# Patient Record
Sex: MALE | Race: BLACK OR AFRICAN AMERICAN | NOT HISPANIC OR LATINO | Employment: OTHER | URBAN - METROPOLITAN AREA
[De-identification: names, ages, dates, MRNs, and addresses within clinical notes are randomized per-mention and may not be internally consistent; named-entity substitution may affect disease eponyms.]

---

## 2025-06-26 ENCOUNTER — APPOINTMENT (OUTPATIENT)
Dept: RADIOLOGY | Facility: HOSPITAL | Age: 75
DRG: 149 | End: 2025-06-26
Payer: MEDICARE

## 2025-06-26 ENCOUNTER — HOSPITAL ENCOUNTER (INPATIENT)
Facility: HOSPITAL | Age: 75
LOS: 1 days | Discharge: ANOTHER HOSPITAL | End: 2025-06-28
Attending: EMERGENCY MEDICINE | Admitting: INTERNAL MEDICINE
Payer: MEDICARE

## 2025-06-26 ENCOUNTER — APPOINTMENT (OUTPATIENT)
Dept: CARDIOLOGY | Facility: HOSPITAL | Age: 75
DRG: 149 | End: 2025-06-26
Payer: MEDICARE

## 2025-06-26 DIAGNOSIS — R42 VERTIGO: ICD-10-CM

## 2025-06-26 DIAGNOSIS — R42 DIZZINESS: Primary | ICD-10-CM

## 2025-06-26 DIAGNOSIS — K21.00 GASTROESOPHAGEAL REFLUX DISEASE WITH ESOPHAGITIS WITHOUT HEMORRHAGE: ICD-10-CM

## 2025-06-26 DIAGNOSIS — I63.422 STROKE DUE TO EMBOLISM OF LEFT ANTERIOR CEREBRAL ARTERY (MULTI): ICD-10-CM

## 2025-06-26 LAB
ALBUMIN SERPL BCP-MCNC: 4.4 G/DL (ref 3.4–5)
ALP SERPL-CCNC: 77 U/L (ref 33–136)
ALT SERPL W P-5'-P-CCNC: 10 U/L (ref 10–52)
ANION GAP SERPL CALC-SCNC: 13 MMOL/L (ref 10–20)
APPEARANCE UR: CLEAR
APTT PPP: 22 SECONDS (ref 26–36)
AST SERPL W P-5'-P-CCNC: 19 U/L (ref 9–39)
ATRIAL RATE: 91 BPM
BASOPHILS # BLD AUTO: 0.01 X10*3/UL (ref 0–0.1)
BASOPHILS NFR BLD AUTO: 0.1 %
BILIRUB SERPL-MCNC: 0.8 MG/DL (ref 0–1.2)
BILIRUB UR STRIP.AUTO-MCNC: NEGATIVE MG/DL
BUN SERPL-MCNC: 15 MG/DL (ref 6–23)
CALCIUM SERPL-MCNC: 9.2 MG/DL (ref 8.6–10.3)
CARDIAC TROPONIN I PNL SERPL HS: 10 NG/L (ref 0–20)
CHLORIDE SERPL-SCNC: 102 MMOL/L (ref 98–107)
CO2 SERPL-SCNC: 28 MMOL/L (ref 21–32)
COLOR UR: ABNORMAL
CREAT SERPL-MCNC: 1.3 MG/DL (ref 0.5–1.3)
EGFRCR SERPLBLD CKD-EPI 2021: 57 ML/MIN/1.73M*2
EOSINOPHIL # BLD AUTO: 0 X10*3/UL (ref 0–0.4)
EOSINOPHIL NFR BLD AUTO: 0 %
ERYTHROCYTE [DISTWIDTH] IN BLOOD BY AUTOMATED COUNT: 12.8 % (ref 11.5–14.5)
GLUCOSE BLD MANUAL STRIP-MCNC: 160 MG/DL (ref 74–99)
GLUCOSE SERPL-MCNC: 169 MG/DL (ref 74–99)
GLUCOSE UR STRIP.AUTO-MCNC: ABNORMAL MG/DL
HCT VFR BLD AUTO: 40.1 % (ref 41–52)
HGB BLD-MCNC: 13.3 G/DL (ref 13.5–17.5)
IMM GRANULOCYTES # BLD AUTO: 0.04 X10*3/UL (ref 0–0.5)
IMM GRANULOCYTES NFR BLD AUTO: 0.6 % (ref 0–0.9)
INR PPP: 1.1 (ref 0.9–1.1)
KETONES UR STRIP.AUTO-MCNC: ABNORMAL MG/DL
LEUKOCYTE ESTERASE UR QL STRIP.AUTO: NEGATIVE
LYMPHOCYTES # BLD AUTO: 0.67 X10*3/UL (ref 0.8–3)
LYMPHOCYTES NFR BLD AUTO: 9.8 %
MCH RBC QN AUTO: 30.1 PG (ref 26–34)
MCHC RBC AUTO-ENTMCNC: 33.2 G/DL (ref 32–36)
MCV RBC AUTO: 91 FL (ref 80–100)
MONOCYTES # BLD AUTO: 0.31 X10*3/UL (ref 0.05–0.8)
MONOCYTES NFR BLD AUTO: 4.5 %
MUCOUS THREADS #/AREA URNS AUTO: NORMAL /LPF
NEUTROPHILS # BLD AUTO: 5.81 X10*3/UL (ref 1.6–5.5)
NEUTROPHILS NFR BLD AUTO: 85 %
NITRITE UR QL STRIP.AUTO: NEGATIVE
NRBC BLD-RTO: 0 /100 WBCS (ref 0–0)
P AXIS: 78 DEGREES
P OFFSET: 210 MS
P ONSET: 160 MS
PH UR STRIP.AUTO: 6.5 [PH]
PLATELET # BLD AUTO: 239 X10*3/UL (ref 150–450)
POTASSIUM SERPL-SCNC: 4.2 MMOL/L (ref 3.5–5.3)
PR INTERVAL: 136 MS
PROT SERPL-MCNC: 7.5 G/DL (ref 6.4–8.2)
PROT UR STRIP.AUTO-MCNC: ABNORMAL MG/DL
PROTHROMBIN TIME: 11.6 SECONDS (ref 9.8–12.4)
Q ONSET: 228 MS
QRS COUNT: 15 BEATS
QRS DURATION: 138 MS
QT INTERVAL: 390 MS
QTC CALCULATION(BAZETT): 479 MS
QTC FREDERICIA: 448 MS
R AXIS: -83 DEGREES
RBC # BLD AUTO: 4.42 X10*6/UL (ref 4.5–5.9)
RBC # UR STRIP.AUTO: ABNORMAL MG/DL
RBC #/AREA URNS AUTO: NORMAL /HPF
SODIUM SERPL-SCNC: 139 MMOL/L (ref 136–145)
SP GR UR STRIP.AUTO: 1.03
T AXIS: 53 DEGREES
T OFFSET: 423 MS
UROBILINOGEN UR STRIP.AUTO-MCNC: NORMAL MG/DL
VENTRICULAR RATE: 91 BPM
WBC # BLD AUTO: 6.8 X10*3/UL (ref 4.4–11.3)
WBC #/AREA URNS AUTO: NORMAL /HPF

## 2025-06-26 PROCEDURE — 2550000001 HC RX 255 CONTRASTS: Performed by: EMERGENCY MEDICINE

## 2025-06-26 PROCEDURE — 85025 COMPLETE CBC W/AUTO DIFF WBC: CPT

## 2025-06-26 PROCEDURE — 2500000004 HC RX 250 GENERAL PHARMACY W/ HCPCS (ALT 636 FOR OP/ED)

## 2025-06-26 PROCEDURE — 96375 TX/PRO/DX INJ NEW DRUG ADDON: CPT

## 2025-06-26 PROCEDURE — 99285 EMERGENCY DEPT VISIT HI MDM: CPT | Performed by: EMERGENCY MEDICINE

## 2025-06-26 PROCEDURE — 93005 ELECTROCARDIOGRAM TRACING: CPT

## 2025-06-26 PROCEDURE — 70551 MRI BRAIN STEM W/O DYE: CPT

## 2025-06-26 PROCEDURE — 96374 THER/PROPH/DIAG INJ IV PUSH: CPT

## 2025-06-26 PROCEDURE — 82947 ASSAY GLUCOSE BLOOD QUANT: CPT

## 2025-06-26 PROCEDURE — 36415 COLL VENOUS BLD VENIPUNCTURE: CPT

## 2025-06-26 PROCEDURE — 80053 COMPREHEN METABOLIC PANEL: CPT

## 2025-06-26 PROCEDURE — 70498 CT ANGIOGRAPHY NECK: CPT

## 2025-06-26 PROCEDURE — 85730 THROMBOPLASTIN TIME PARTIAL: CPT

## 2025-06-26 PROCEDURE — 2500000002 HC RX 250 W HCPCS SELF ADMINISTERED DRUGS (ALT 637 FOR MEDICARE OP, ALT 636 FOR OP/ED)

## 2025-06-26 PROCEDURE — 85610 PROTHROMBIN TIME: CPT

## 2025-06-26 PROCEDURE — 84484 ASSAY OF TROPONIN QUANT: CPT

## 2025-06-26 PROCEDURE — 70498 CT ANGIOGRAPHY NECK: CPT | Performed by: STUDENT IN AN ORGANIZED HEALTH CARE EDUCATION/TRAINING PROGRAM

## 2025-06-26 PROCEDURE — 70496 CT ANGIOGRAPHY HEAD: CPT | Performed by: STUDENT IN AN ORGANIZED HEALTH CARE EDUCATION/TRAINING PROGRAM

## 2025-06-26 PROCEDURE — 70551 MRI BRAIN STEM W/O DYE: CPT | Performed by: STUDENT IN AN ORGANIZED HEALTH CARE EDUCATION/TRAINING PROGRAM

## 2025-06-26 PROCEDURE — 81001 URINALYSIS AUTO W/SCOPE: CPT

## 2025-06-26 RX ORDER — MECLIZINE HYDROCHLORIDE 25 MG/1
25 TABLET ORAL ONCE
Status: COMPLETED | OUTPATIENT
Start: 2025-06-26 | End: 2025-06-26

## 2025-06-26 RX ORDER — FAMOTIDINE 10 MG/ML
20 INJECTION, SOLUTION INTRAVENOUS ONCE
Status: COMPLETED | OUTPATIENT
Start: 2025-06-26 | End: 2025-06-26

## 2025-06-26 RX ORDER — ONDANSETRON HYDROCHLORIDE 2 MG/ML
4 INJECTION, SOLUTION INTRAVENOUS ONCE
Status: COMPLETED | OUTPATIENT
Start: 2025-06-26 | End: 2025-06-26

## 2025-06-26 RX ADMIN — FAMOTIDINE 20 MG: 10 INJECTION, SOLUTION INTRAVENOUS at 15:00

## 2025-06-26 RX ADMIN — MECLIZINE HYDROCHLORIDE 25 MG: 25 TABLET ORAL at 17:37

## 2025-06-26 RX ADMIN — ONDANSETRON 4 MG: 2 INJECTION INTRAMUSCULAR; INTRAVENOUS at 15:00

## 2025-06-26 RX ADMIN — IOHEXOL 75 ML: 350 INJECTION, SOLUTION INTRAVENOUS at 15:39

## 2025-06-26 ASSESSMENT — PAIN SCALES - GENERAL
PAINLEVEL_OUTOF10: 0 - NO PAIN

## 2025-06-26 ASSESSMENT — PAIN - FUNCTIONAL ASSESSMENT: PAIN_FUNCTIONAL_ASSESSMENT: 0-10

## 2025-06-26 NOTE — ED PROVIDER NOTES
History of Present Illness     History provided by: Patient and his son at bedside.  Limitations to History: None   External Records Reviewed with Brief Summary: No old records available for review.    HPI:  Raymundo Herndon is a 74 y.o. male with a past medical history of hypertension not on any medications, and GERD presenting to the emergency department today with concern for dizziness started around 2 AM.  States that he has been having difficulty with ambulating, feels that he is going to fall over when he walks.  Has some lightheadedness but denies any vision changes.  Does have a left eye cataract.  Has been having nausea and vomiting associated with this as well.  Not nonbilious nonbloody.  Denies any headache chest pain shortness of breath.  States that he otherwise feels well.  Was at 1 point told that he may have glaucoma of the left eye however has not followed up with optometry since.  Has not followed with a primary care doctor has not had any medication refills and has not been on any medications long-term.  No other associate signs or symptoms report this time.    Physical Exam   Triage vitals:  T 36.6 °C (97.9 °F)  HR 87  /85  RR 18  O2 98 % None (Room air)    General: Awake, alert, in no acute distress  Eyes: Gaze conjugate.  No scleral icterus or injection cataract formation in the left eye.  Horizontal nystagmus.  HENT: Normo-cephalic, atraumatic. No stridor  CV: Regular rate, regular rhythm. Radial pulses 2+ bilaterally  Resp: Breathing non-labored, speaking in full sentences.  Clear to auscultation bilaterally  GI: Soft, non-distended, non-tender. No rebound or guarding.  MSK/Extremities: No gross bony deformities. Moving all extremities  Skin: Warm. Appropriate color  Neuro: Alert. Oriented. Face symmetric. Speech is fluent.  Gross strength and sensation intact in b/l UE and LEs.  Has a horizontal nystagmus that does not fatigue, is present at rest.  Finger-nose without any dysmetria.   Heel-to-shin without any difficulty, no dysarthria.  NIH of 0.  Sensation intact.  Cranial nerves II through XII intact 5 out of 5 shoulder shrug.  Facial nerve in all 3 distributions bilaterally symmetric. Gait is abnormal with drift when ambulating.  Psych: Appropriate mood and affect    Medical Decision Making & ED Course   Medical Decision Makin y.o. male with the above past medical history not currently treated not on any medications with the exception of gas medication presenting to the emergency department today with concern for dizziness and unstable gait and nausea vomiting secondary to the dizziness discharge started around 2 AM.  Patient is out of any window for TNK, his NIH is 0.  Concerned that this may be a posterior circulation stroke versus vertigo, initially had a nystagmus that seem to be rotary then stable out to be a horizontal nystagmus that does not fatigue.  The rest of his neuroexam is otherwise reassuring.  Kidney function LFTs are otherwise reassuring.  No significant leukocytosis or anemia.  Troponin was 10.  CT a head and neck was obtained which was otherwise reassuring the results are in the ED course tab.  Will obtain an MRI to rule out stroke.  Was given meclizine and Pepcid for his nausea vomiting in addition to Zofran.  No significant hypertension, vitally remained stable.  At the time of signout still pending MRI and final disposition attending physician assumed care for the patient at signout.  ----    Differential diagnoses considered include but are not limited to: Posterior circulation stroke, vertigo, nausea/vomiting     Social Determinants of Health which Significantly Impact Care: None identified     EKG Independent Interpretation: See ED Course    Independent Result Review and Interpretation: See ED course    Chronic conditions affecting the patient's care: See HPI    The patient was discussed with the following consultants/services: None    Care Considerations: See  [facility]    ED Course:  ED Course as of 06/27/25 1114   Thu Jun 26, 2025   1628 CT angio head and neck w and wo IV contrast  IMPRESSION:  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      2. Mild diffuse narrowing of the P1 and P2 segment of the right PCA.  Otherwise no hemodynamically significant stenosis of the intracranial  vasculature.      3. Approximately 30% right and 40% left narrowing of the internal  carotid arteries in the neck.      Recommendation: If clinically concerned for small acute ischemic  infarct, further evaluation with MR of the brain without contrast is  recommended. [KD]   2131 MRI report read as reviewed.  Does not have any acute stroke.  No bleed reported.  Patient is ambulatory.  Tolerating p.o.  He can be safely discharged home plan for outpatient follow-up.  Return precautions are discussed. [PV]      ED Course User Index  [KD] Celeste Perez DO  [PV] Maico Chacon MD         Diagnoses as of 06/27/25 1114   Dizziness   Vertigo     Disposition   Patient was signed out to Dr. Harley at 1900 pending completion of their work-up.  Please see the next provider's transition of care note for the remainder of the patient's care.     Seen and discussed with ED Attending  Celeste Perez DO, PGY-2  Emergency Medicine     Celeste Perez DO  Resident  06/26/25 1909       Celeste Perez DO  Resident  06/27/25 1114

## 2025-06-27 PROBLEM — R42 DIZZINESS: Status: ACTIVE | Noted: 2025-06-27

## 2025-06-27 LAB
EST. AVERAGE GLUCOSE BLD GHB EST-MCNC: 105 MG/DL
HBA1C MFR BLD: 5.3 % (ref ?–5.7)
HOLD SPECIMEN: NORMAL
HOLD SPECIMEN: NORMAL

## 2025-06-27 PROCEDURE — 83036 HEMOGLOBIN GLYCOSYLATED A1C: CPT | Mod: AHULAB | Performed by: NURSE PRACTITIONER

## 2025-06-27 PROCEDURE — 97161 PT EVAL LOW COMPLEX 20 MIN: CPT | Mod: GP

## 2025-06-27 PROCEDURE — 2500000004 HC RX 250 GENERAL PHARMACY W/ HCPCS (ALT 636 FOR OP/ED): Performed by: NURSE PRACTITIONER

## 2025-06-27 PROCEDURE — 97165 OT EVAL LOW COMPLEX 30 MIN: CPT | Mod: GO

## 2025-06-27 PROCEDURE — 1200000002 HC GENERAL ROOM WITH TELEMETRY DAILY

## 2025-06-27 PROCEDURE — 97530 THERAPEUTIC ACTIVITIES: CPT | Mod: GO

## 2025-06-27 PROCEDURE — 36415 COLL VENOUS BLD VENIPUNCTURE: CPT | Performed by: NURSE PRACTITIONER

## 2025-06-27 PROCEDURE — 97116 GAIT TRAINING THERAPY: CPT | Mod: GP

## 2025-06-27 PROCEDURE — 99223 1ST HOSP IP/OBS HIGH 75: CPT | Performed by: INTERNAL MEDICINE

## 2025-06-27 PROCEDURE — 2500000001 HC RX 250 WO HCPCS SELF ADMINISTERED DRUGS (ALT 637 FOR MEDICARE OP): Performed by: NURSE PRACTITIONER

## 2025-06-27 PROCEDURE — 99222 1ST HOSP IP/OBS MODERATE 55: CPT | Performed by: STUDENT IN AN ORGANIZED HEALTH CARE EDUCATION/TRAINING PROGRAM

## 2025-06-27 RX ORDER — ACETAMINOPHEN 325 MG/1
650 TABLET ORAL EVERY 4 HOURS PRN
Status: DISCONTINUED | OUTPATIENT
Start: 2025-06-27 | End: 2025-06-28 | Stop reason: HOSPADM

## 2025-06-27 RX ORDER — ATORVASTATIN CALCIUM 40 MG/1
40 TABLET, FILM COATED ORAL NIGHTLY
Status: DISCONTINUED | OUTPATIENT
Start: 2025-06-28 | End: 2025-06-28

## 2025-06-27 RX ORDER — CLOPIDOGREL BISULFATE 75 MG/1
75 TABLET ORAL DAILY
Status: DISCONTINUED | OUTPATIENT
Start: 2025-06-28 | End: 2025-06-28 | Stop reason: HOSPADM

## 2025-06-27 RX ORDER — ONDANSETRON HYDROCHLORIDE 2 MG/ML
4 INJECTION, SOLUTION INTRAVENOUS EVERY 8 HOURS PRN
Status: DISCONTINUED | OUTPATIENT
Start: 2025-06-27 | End: 2025-06-28 | Stop reason: HOSPADM

## 2025-06-27 RX ORDER — ACETAMINOPHEN 650 MG/1
650 SUPPOSITORY RECTAL EVERY 4 HOURS PRN
Status: DISCONTINUED | OUTPATIENT
Start: 2025-06-27 | End: 2025-06-28 | Stop reason: HOSPADM

## 2025-06-27 RX ORDER — ENOXAPARIN SODIUM 100 MG/ML
40 INJECTION SUBCUTANEOUS EVERY 24 HOURS
Status: DISCONTINUED | OUTPATIENT
Start: 2025-06-27 | End: 2025-06-28 | Stop reason: HOSPADM

## 2025-06-27 RX ORDER — NAPROXEN SODIUM 220 MG/1
81 TABLET, FILM COATED ORAL DAILY
Status: DISCONTINUED | OUTPATIENT
Start: 2025-06-28 | End: 2025-06-28 | Stop reason: HOSPADM

## 2025-06-27 RX ORDER — ACETAMINOPHEN 160 MG/5ML
650 SOLUTION ORAL EVERY 4 HOURS PRN
Status: DISCONTINUED | OUTPATIENT
Start: 2025-06-27 | End: 2025-06-28 | Stop reason: HOSPADM

## 2025-06-27 RX ORDER — ONDANSETRON 4 MG/1
4 TABLET, FILM COATED ORAL EVERY 8 HOURS PRN
Status: DISCONTINUED | OUTPATIENT
Start: 2025-06-27 | End: 2025-06-28 | Stop reason: HOSPADM

## 2025-06-27 RX ORDER — SODIUM CHLORIDE 9 MG/ML
75 INJECTION, SOLUTION INTRAVENOUS CONTINUOUS
Status: ACTIVE | OUTPATIENT
Start: 2025-06-27 | End: 2025-06-27

## 2025-06-27 RX ORDER — PANTOPRAZOLE SODIUM 40 MG/10ML
40 INJECTION, POWDER, LYOPHILIZED, FOR SOLUTION INTRAVENOUS
Status: DISCONTINUED | OUTPATIENT
Start: 2025-06-27 | End: 2025-06-28 | Stop reason: HOSPADM

## 2025-06-27 RX ORDER — POLYETHYLENE GLYCOL 3350 17 G/17G
17 POWDER, FOR SOLUTION ORAL DAILY PRN
Status: DISCONTINUED | OUTPATIENT
Start: 2025-06-27 | End: 2025-06-28 | Stop reason: HOSPADM

## 2025-06-27 RX ORDER — MECLIZINE HYDROCHLORIDE 25 MG/1
25 TABLET ORAL 3 TIMES DAILY PRN
Status: DISCONTINUED | OUTPATIENT
Start: 2025-06-27 | End: 2025-06-28 | Stop reason: HOSPADM

## 2025-06-27 RX ORDER — PANTOPRAZOLE SODIUM 40 MG/1
40 TABLET, DELAYED RELEASE ORAL
Status: DISCONTINUED | OUTPATIENT
Start: 2025-06-27 | End: 2025-06-28 | Stop reason: HOSPADM

## 2025-06-27 RX ADMIN — SODIUM CHLORIDE 75 ML/HR: 0.9 INJECTION, SOLUTION INTRAVENOUS at 06:00

## 2025-06-27 RX ADMIN — ENOXAPARIN SODIUM 40 MG: 40 INJECTION SUBCUTANEOUS at 06:00

## 2025-06-27 RX ADMIN — ATORVASTATIN CALCIUM 40 MG: 40 TABLET, FILM COATED ORAL at 23:54

## 2025-06-27 RX ADMIN — PANTOPRAZOLE SODIUM 40 MG: 40 TABLET, DELAYED RELEASE ORAL at 06:00

## 2025-06-27 SDOH — ECONOMIC STABILITY: FOOD INSECURITY: WITHIN THE PAST 12 MONTHS, YOU WORRIED THAT YOUR FOOD WOULD RUN OUT BEFORE YOU GOT THE MONEY TO BUY MORE.: NEVER TRUE

## 2025-06-27 SDOH — SOCIAL STABILITY: SOCIAL INSECURITY
WITHIN THE LAST YEAR, HAVE YOU BEEN RAPED OR FORCED TO HAVE ANY KIND OF SEXUAL ACTIVITY BY YOUR PARTNER OR EX-PARTNER?: NO

## 2025-06-27 SDOH — SOCIAL STABILITY: SOCIAL INSECURITY
WITHIN THE LAST YEAR, HAVE YOU BEEN KICKED, HIT, SLAPPED, OR OTHERWISE PHYSICALLY HURT BY YOUR PARTNER OR EX-PARTNER?: NO

## 2025-06-27 SDOH — ECONOMIC STABILITY: INCOME INSECURITY: IN THE PAST 12 MONTHS HAS THE ELECTRIC, GAS, OIL, OR WATER COMPANY THREATENED TO SHUT OFF SERVICES IN YOUR HOME?: NO

## 2025-06-27 SDOH — SOCIAL STABILITY: SOCIAL INSECURITY: WITHIN THE LAST YEAR, HAVE YOU BEEN HUMILIATED OR EMOTIONALLY ABUSED IN OTHER WAYS BY YOUR PARTNER OR EX-PARTNER?: NO

## 2025-06-27 SDOH — SOCIAL STABILITY: SOCIAL INSECURITY: WITHIN THE LAST YEAR, HAVE YOU BEEN AFRAID OF YOUR PARTNER OR EX-PARTNER?: NO

## 2025-06-27 SDOH — SOCIAL STABILITY: SOCIAL INSECURITY: WERE YOU ABLE TO COMPLETE ALL THE BEHAVIORAL HEALTH SCREENINGS?: YES

## 2025-06-27 SDOH — SOCIAL STABILITY: SOCIAL INSECURITY: DO YOU FEEL UNSAFE GOING BACK TO THE PLACE WHERE YOU ARE LIVING?: NO

## 2025-06-27 SDOH — SOCIAL STABILITY: SOCIAL INSECURITY: HAS ANYONE EVER THREATENED TO HURT YOUR FAMILY OR YOUR PETS?: NO

## 2025-06-27 SDOH — ECONOMIC STABILITY: FOOD INSECURITY: WITHIN THE PAST 12 MONTHS, THE FOOD YOU BOUGHT JUST DIDN'T LAST AND YOU DIDN'T HAVE MONEY TO GET MORE.: NEVER TRUE

## 2025-06-27 SDOH — SOCIAL STABILITY: SOCIAL INSECURITY: DO YOU FEEL ANYONE HAS EXPLOITED OR TAKEN ADVANTAGE OF YOU FINANCIALLY OR OF YOUR PERSONAL PROPERTY?: NO

## 2025-06-27 SDOH — SOCIAL STABILITY: SOCIAL INSECURITY: DOES ANYONE TRY TO KEEP YOU FROM HAVING/CONTACTING OTHER FRIENDS OR DOING THINGS OUTSIDE YOUR HOME?: NO

## 2025-06-27 SDOH — SOCIAL STABILITY: SOCIAL INSECURITY: ABUSE: ADULT

## 2025-06-27 SDOH — SOCIAL STABILITY: SOCIAL INSECURITY: ARE YOU OR HAVE YOU BEEN THREATENED OR ABUSED PHYSICALLY, EMOTIONALLY, OR SEXUALLY BY ANYONE?: NO

## 2025-06-27 SDOH — SOCIAL STABILITY: SOCIAL INSECURITY: HAVE YOU HAD THOUGHTS OF HARMING ANYONE ELSE?: NO

## 2025-06-27 SDOH — SOCIAL STABILITY: SOCIAL INSECURITY: ARE THERE ANY APPARENT SIGNS OF INJURIES/BEHAVIORS THAT COULD BE RELATED TO ABUSE/NEGLECT?: NO

## 2025-06-27 ASSESSMENT — COGNITIVE AND FUNCTIONAL STATUS - GENERAL
MOVING TO AND FROM BED TO CHAIR: A LOT
PERSONAL GROOMING: A LITTLE
TOILETING: A LITTLE
WALKING IN HOSPITAL ROOM: A LITTLE
STANDING UP FROM CHAIR USING ARMS: A LITTLE
DAILY ACTIVITIY SCORE: 24
DRESSING REGULAR UPPER BODY CLOTHING: A LITTLE
TURNING FROM BACK TO SIDE WHILE IN FLAT BAD: A LITTLE
HELP NEEDED FOR BATHING: A LITTLE
WALKING IN HOSPITAL ROOM: A LOT
CLIMB 3 TO 5 STEPS WITH RAILING: TOTAL
MOBILITY SCORE: 14
PATIENT BASELINE BEDBOUND: NO
MOVING TO AND FROM BED TO CHAIR: A LITTLE
MOBILITY SCORE: 20
STANDING UP FROM CHAIR USING ARMS: A LOT
TOILETING: A LITTLE
DRESSING REGULAR LOWER BODY CLOTHING: A LITTLE
CLIMB 3 TO 5 STEPS WITH RAILING: TOTAL
WALKING IN HOSPITAL ROOM: A LITTLE
MOBILITY SCORE: 16
PERSONAL GROOMING: A LITTLE
DRESSING REGULAR LOWER BODY CLOTHING: A LITTLE
MOVING FROM LYING ON BACK TO SITTING ON SIDE OF FLAT BED WITH BEDRAILS: A LITTLE
CLIMB 3 TO 5 STEPS WITH RAILING: A LITTLE
TURNING FROM BACK TO SIDE WHILE IN FLAT BAD: A LITTLE
HELP NEEDED FOR BATHING: A LITTLE
CLIMB 3 TO 5 STEPS WITH RAILING: A LITTLE
MOBILITY SCORE: 20
DAILY ACTIVITIY SCORE: 19
WALKING IN HOSPITAL ROOM: A LITTLE
STANDING UP FROM CHAIR USING ARMS: A LITTLE
DAILY ACTIVITIY SCORE: 24
STANDING UP FROM CHAIR USING ARMS: A LITTLE
DAILY ACTIVITIY SCORE: 20
MOVING TO AND FROM BED TO CHAIR: A LITTLE
MOVING TO AND FROM BED TO CHAIR: A LITTLE

## 2025-06-27 ASSESSMENT — LIFESTYLE VARIABLES
HOW MANY STANDARD DRINKS CONTAINING ALCOHOL DO YOU HAVE ON A TYPICAL DAY: PATIENT DOES NOT DRINK
HOW OFTEN DO YOU HAVE A DRINK CONTAINING ALCOHOL: NEVER
SKIP TO QUESTIONS 9-10: 1
AUDIT-C TOTAL SCORE: 0
AUDIT-C TOTAL SCORE: 0
HOW OFTEN DO YOU HAVE 6 OR MORE DRINKS ON ONE OCCASION: NEVER

## 2025-06-27 ASSESSMENT — ACTIVITIES OF DAILY LIVING (ADL)
ADL_ASSISTANCE: INDEPENDENT
DRESSING YOURSELF: INDEPENDENT
JUDGMENT_ADEQUATE_SAFELY_COMPLETE_DAILY_ACTIVITIES: YES
BATHING: INDEPENDENT
HEARING - RIGHT EAR: FUNCTIONAL
LACK_OF_TRANSPORTATION: NO
TOILETING: NEEDS ASSISTANCE
HEARING - LEFT EAR: FUNCTIONAL
BATHING_ASSISTANCE: STAND BY
ADEQUATE_TO_COMPLETE_ADL: YES
PATIENT'S MEMORY ADEQUATE TO SAFELY COMPLETE DAILY ACTIVITIES?: YES
ASSISTIVE_DEVICE: EYEGLASSES
WALKS IN HOME: NEEDS ASSISTANCE
GROOMING: INDEPENDENT
LACK_OF_TRANSPORTATION: NO
ADL_ASSISTANCE: INDEPENDENT
FEEDING YOURSELF: INDEPENDENT

## 2025-06-27 ASSESSMENT — PAIN SCALES - GENERAL
PAINLEVEL_OUTOF10: 0 - NO PAIN

## 2025-06-27 ASSESSMENT — PAIN - FUNCTIONAL ASSESSMENT
PAIN_FUNCTIONAL_ASSESSMENT: 0-10

## 2025-06-27 ASSESSMENT — PATIENT HEALTH QUESTIONNAIRE - PHQ9
SUM OF ALL RESPONSES TO PHQ9 QUESTIONS 1 & 2: 0
1. LITTLE INTEREST OR PLEASURE IN DOING THINGS: NOT AT ALL
2. FEELING DOWN, DEPRESSED OR HOPELESS: NOT AT ALL

## 2025-06-27 NOTE — PROGRESS NOTES
"Occupational Therapy    Evaluation/Treatment    Patient Name: Raymundo Herndon  MRN: [id number]  Department: [place] [address]  Room: [address] [place] [address]  Today's Date: 06/27/25  Time Calculation  Start Time: 1522  Stop Time: 1554  Time Calculation (min): 32 min       Assessment:  OT Assessment: Patient demonstrates significant standing balance impairments that impact safety during ADL and functional mobility.  Patient would benefit from further skilled OT services to maximize functional performance.  Prognosis: Excellent  Barriers to Discharge Home: Caregiver assistance, Physical needs  Caregiver Assistance: Other (Comment) (Family is \"in & out\"; 24/7 assist/supervision is not available)  Physical Needs: Ambulating household distances limited by function/safety, High falls risk due to function or environment, In-home setup navigation limited by function/safety  Evaluation/Treatment Tolerance: Patient tolerated treatment well  Medical Staff Made Aware: Yes  End of Session Communication: Bedside nurse  End of Session Patient Position: Bed, 2 rail up, Alarm on (All needs within reach.)  OT Assessment Results: Decreased ADL status, Decreased functional mobility  Prognosis: Excellent  Evaluation/Treatment Tolerance: Patient tolerated treatment well  Medical Staff Made Aware: Yes  Strengths: Ability to acquire knowledge, Attitude of self, Premorbid level of function, Physical health  Barriers to Participation: Comorbidities, Housing layout    Plan:  Treatment Interventions: ADL retraining, Functional transfer training, Neuromuscular reeducation  OT Frequency: 3 times per week (this acute inpatient hospitalization)  OT Discharge Recommendations: High intensity level of continued care (Based on current functional status and rehab potential, patient is anticipated to tolerate and benefit from 5 or more days per week of skilled rehabilitative therapy after discharge from this acute inpatient hospitalization.)  Equipment Recommended upon Discharge: " Wheeled walker  OT Recommended Transfer Status: Minimal assist, Assist of 1  OT - OK to Discharge: Yes (OT POC established this date)  Treatment Interventions: ADL retraining, Functional transfer training, Neuromuscular reeducation    Subjective   OT Visit Info:  OT Received On: 06/27/25    General Visit Info:   General  Reason for Referral: Dizziness; Vertigo  Past Medical History Relevant to Rehab: Presented to ED due to c/o dizziness and vomiting; PMH: HTN, GERD, left cataract  Family/Caregiver Present: No  Prior to Session Communication: Bedside nurse  Patient Position Received: Bed, 2 rail up, Alarm on  General Comment: Agreeable to Eval; Pleasant and cooperative throughout; No c/o dizziness for duration of session     Precautions:  Hearing/Visual Limitations: WFL; not wearing glasses during session; no difficulty hearing  Medical Precautions: Fall precautions  Precautions Comment: tele, IV    Pain:  Pain Assessment  Pain Assessment: 0-10  0-10 (Numeric) Pain Score: 0 - No pain    Objective   Cognition:  Overall Cognitive Status: Within Functional Limits  Arousal/Alertness: Appropriate responses to stimuli  Orientation Level: Oriented X4  Following Commands: Follows all commands and directions without difficulty  Cognition Comments: Cues req'd for pacing  Safety/Judgement: Exceptions to WFL  Unable to Self-Monitor and Self-Correct Consistently: Minimal  Insight: Mild       Home Living:  Type of Home: House  Lives With: Other (Comment) (Cousin & cousin's children)  Home Adaptive Equipment: None  Home Layout: Two level, Full bath main level (Patient resides on basement level; no bathroom basement; laundry in basement,  but not working)  Alternate Level Stairs-Number of Steps: 13 stairs to basement  Home Access:  (3 UZIEL)  Bathroom Shower/Tub: Tub/shower unit  Bathroom Toilet: Standard  Bathroom Equipment: None    Prior Function:  Level of [place]: Independent with ADLs and functional transfers, Independent  with homemaking with ambulation  Receives Help From: Family ( assist/supervision not available)  ADL Assistance: Independent  Homemaking Assistance: Independent (Shares IADL as needed; able to make coffee daily and prepare snacks & light meals)  Laundry:  (Washer & dryer not working currently; family goes to laundromat - Patient has gone with them 1x)  Driving/Transportation: Family/Friend (Patient does not drive; DL ; does not leave home much)  Ambulatory Assistance: Independent  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; 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)  Leisure: Spends a number of hours in prayer on the floor  Hand Dominance: Right       ADL:  Eating Assistance: Independent  Grooming Assistance: Stand by  Grooming Deficit: Setup, Supervision/safety  Bathing Assistance: Stand by (CGA if standing)  Bathing Deficit: Setup, Steadying, Verbal cueing, Supervision/safety  UE Dressing Assistance: Minimal  UE Dressing Deficit: Setup, Supervision/safety  LE Dressing Assistance: Stand by (CGA while standing)  LE Dressing Deficit: Setup, Steadying, Verbal cueing, Supervision/safety  Toileting Deficit: Setup, Supervison/safety  ADL Comments: Able to complete ADL after setup, but very unsafe while standing due to balance deficits.    Activities of Daily Living:    UE Bathing  UE Bathing Level of Assistance: Close supervision, Setup  UE Bathing Where Assessed: Edge of bed  UE Bathing Comments: After setup and cuing to initiate task, patient able to retrieve bathing wipes to cleanse chest, underarms, and arms.    LE Bathing  LE Bathing Comments: Patient demo'd ability to reach all areas, but declined bathing LB during session.  Anticipate sup while seated or at bed level and CGA-Min assist for safety if standing.    UE Dressing  UE Dressing Comments: Min assist to manage tele and change gowns    LE Dressing  LE Dressing: Yes  Sock Level of Assistance: Distant supervision, Setup  LE Dressing Where  Assessed: Edge of bed  LE Dressing Comments: Instructed on safe technique.  Patient able to lift up each leg to don/doff socks without difficulty. Reiterated safety precautions and technique for bottoms to minimize risk of falls. Patient expressed understanding.       Activity Tolerance:  Endurance: Endurance does not limit participation in activity  Activity Tolerance Comments: WFL    Functional Standing Tolerance:  Time: 2-3 minutes continuously  Activity: Standing balance exercises and functional mobility to/from bathroom  Functional Standing Tolerance Comments: Standing tolerance WFL    Bed Mobility/Transfers: Bed Mobility  Bed Mobility: Yes  Bed Mobility 1  Bed Mobility 1: Supine to sitting, Sitting to supine, Scooting  Level of Assistance 1: Distant supervision, Minimal verbal cues  Bed Mobility Comments 1: HOB slightly elevated. No physical assist req'd.  Min verbal cues req'd to position self for comfort.    Transfers  Transfer: Yes  Transfer 1  Transfer From 1: Bed to  Transfer to 1: Stand  Technique 1: Sit to stand, Stand to sit  Transfer Device 1:  (No device for 1st stand; walker for 2nd stand)  Transfer Level of Assistance 1: Contact guard, Minimal verbal cues  Trials/Comments 1: Instructed on safe technique and body positioning  Transfers 2  Transfer From 2: Stand to  Transfer to 2: Toilet  Technique 2: Stand to sit, Sit to stand (ambulating)  Transfer Device 2: Walker (Grab bar)  Transfer Level of Assistance 2: Contact guard    Toilet Transfers  Toilet Transfer From: Walker  Toilet Transfer Type: To and from  Toilet Transfer to: Standard toilet  Toilet Transfer Technique: Ambulating  Toilet Transfers: Contact guard, Verbal cues  Toilet Transfers Comments: Cuing req'd for safe body position in front of toilet prior to attempting to sit, and use of grab bar in lieu of holding onto walker.  Able to complete transfer 2x without difficulty, but CGA for safety.    Functional Mobility:  Functional  Mobility  Functional Mobility Performed: Yes  Functional Mobility 1  Surface 1: Level tile  Device 1: Rolling walker  Assistance 1: Minimum assistance, Moderate assistance  Comments 1: Unsteady with primarily CGA for safety; significant LOB when turning & changing direction with min assist to correct at times and mod assist to correct at times    Sitting Balance:  Static Sitting Balance  Static Sitting-Balance Support: No upper extremity supported, Feet supported  Static Sitting-Level of Assistance: Distant supervision  Dynamic Sitting Balance  Dynamic Sitting-Balance Support: No upper extremity supported, Feet supported  Dynamic Sitting-Level of Assistance: Close supervision (Verbal cues)  Dynamic Sitting-Balance: Trunk control activities, Reaching across midline, Reaching for objects  Dynamic Sitting-Comments: Progressive leaning toward left side with activity; able to correct with cuing    Standing Balance:  Static Standing Balance  Static Standing-Balance Support: No upper extremity supported  Static Standing-Level of Assistance: Close supervision  Static Standing-Comment/Number of Minutes: No LOB noted while standing eob  Dynamic Standing Balance  Dynamic Standing-Balance Support: No upper extremity supported  Dynamic Standing-Level of Assistance: Moderate assistance  Dynamic Standing-Balance: Reaching for objects, Turning (Marching)  Dynamic Standing-Comments: Significant LOB noted with mod assist to correct for safety; slightly improved with smaller movements and cuing for safety awareness and pacing; further improved with BUE support using walker       Therapy/Activity: Therapeutic Activity  Therapeutic Activity Performed: Yes  Therapeutic Activity 1: Encouraged oob activity and facilitated safe participation in ADL and functional mobility.    Balance/Neuromuscular Re-Education  Balance/Neuromuscular Re-Education Activity Performed: Yes  Balance/Neuromuscular Re-Education Activity 1: Dynamic standing balance  "at bedside without UB support and with use of walker.  Significant instability noted with mod assist req'd to correct at times.  Cuing req'd for pacing and safety awareness.     Vision:Vision - Basic Assessment  Visual History: Cataracts  Patient Visual Report:  (Reports vision is WFL despite left cataract and report of \"possible glaucoma\")    Sensation:  Light Touch: No apparent deficits    Strength:  Strength Comments: BUE WNL     Perception:  Inattention/Neglect: Cues to maintain midline in sitting, Cues to maintain midline in standing    Coordination:  Movements are Fluid and Coordinated: Yes  Finger to Nose: Intact (BUE)  Rapid Alternating Movements: Intact  Finger to Target: Intact (BUE)     Hand Function:  Hand Function  Gross Grasp: Functional  Coordination: Functional    Extremities: RUE   RUE : Within Functional Limits and LUE   LUE: Within Functional Limits    Outcome Measures: [facility] Daily Activity  Putting on and taking off regular lower body clothing: A little  Bathing (including washing, rinsing, drying): A little  Putting on and taking off regular upper body clothing: A little  Toileting, which includes using toilet, bedpan or urinal: A little  Taking care of personal grooming such as brushing teeth: A little  Eating Meals: None  Daily Activity - Total Score: 19    OT Adult Other Outcome Measures  Modified Barthel Index (Gracia version): 64    Patient scored 64 on MBI (Modified Barthel Index-Gracia Version) indicating moderate dependence. Per the scoring guidelines, this score indicates a prediction of the followin-80: If living alone will probably need a number of community services to cope.    Deficits noted in the following areas: standing balance affecting all areas of ADL and functional mobility.    Patient does not have adequate assistance or supervision at home to remain safe at this time and is at a high risk for falls.    Education Documentation  Precautions, taught by Nohemi Sharma, OT " at 6/27/2025  5:24 PM.  Learner: Patient  Readiness: Acceptance  Method: Explanation  Response: Verbalizes Understanding  Comment: Safety during ADL and functional mobility    ADL Training, taught by Nohemi Sharma OT at 6/27/2025  5:24 PM.  Learner: Patient  Readiness: Acceptance  Method: Explanation  Response: Verbalizes Understanding  Comment: Safety during ADL and functional mobility    Education Comments  No comments found.      Goals:  Encounter Problems       Encounter Problems (Active)       Bathing       STG - Patient will bathe body Mod I       Start:  06/27/25    Expected End:  07/11/25               Dressings Lower Extremities       STG - Patient will complete lower body dressing Mod I       Start:  06/27/25    Expected End:  07/11/25               Functional Balance       STG - Maintains dynamic standing balance with and without upper extremity support to safely complete ADL Mod I       Start:  06/27/25    Expected End:  07/11/25       INTERVENTIONS:1. Practice standing with minimal support.2. Educate patient about standing tolerance.3. Educate patient about independence with gait, transfers, and ADL's.4. Educate patient about use of assistive device.5. Educate patient about self-directed care.            Functional Mobility       STG - Patient will safely ambulate household distance to mobilize to/from bathroom Mod I without LOB       Start:  06/27/25    Expected End:  07/11/25               OT Transfers       STG - Patient will perform toilet transfer Mod I       Start:  06/27/25    Expected End:  07/11/25               Toileting       STG - Patient will complete toileting tasks with Mod I/I       Start:  06/27/25    Expected End:  07/11/25

## 2025-06-27 NOTE — PROGRESS NOTES
06/27/25 1630   Discharge Planning   Home or Post Acute Services Post acute facilities (Rehab/SNF/etc)   Type of Post Acute Facility Services Rehab   Expected Discharge Disposition Rehab   Does the patient need discharge transport arranged? Yes   RoundTrip coordination needed? Yes   Has discharge transport been arranged? No     Met with patient at bedside, he would like to go to [facility], no auth needed. Referral sent. Possible dc there tomorrow. SW will follow.

## 2025-06-27 NOTE — DISCHARGE INSTRUCTIONS
Please follow-up with the neurosurgeons to continue care for your aneurysm.  Please follow-up with your primary care physician in the next 2 days to ensure improvement in symptoms.  Please return to ED for worsening headache, difficulty moving arms or legs, change in behavior or any other concerning symptoms.

## 2025-06-27 NOTE — PROGRESS NOTES
Pharmacy Medication History     Source of Information: patient    Additional concerns with the patient's PTA list.   Per patient no OTC items on regular basis and no Rx.   Notified Provider via Haiku : no ( no changes )     The following updates were made to the Prior to Admission medication list:     Medications ADDED:   N/a  Medications CHANGED:  N/a  Medications REMOVED:   N/a  Medications NOT TAKING:   N/a    Allergy reviewed : Yes    Meds 2 Beds : No    Outpatient pharmacy confirmed and updated in chart : Yes    Pharmacy name: [facility]    The list below reflectives the updated PTA list. Please review each medication in order reconciliation for additional clarification and justification.    Prior to Admission Medications   Prescriptions Last Dose   aluminum hydrox-magnesium carb 160-105 mg tablet,chewable 6/26/2025 Morning   Sig: Chew and swallow 2 tablets every 12 hours if needed (for dyspepsia).      Facility-Administered Medications: None       The list below reflectives the updated allergy list. Please review each documented allergy for additional clarification and justification.    No Known Allergies       06/27/25 at 12:55 PM - Jodi Ritchie

## 2025-06-27 NOTE — PROGRESS NOTES
Physical Therapy    Physical Therapy Evaluation & Treatment    Patient Name: Raymundo Herndon  MRN: [id number]  Department: [facility] [address]  Room: [address] [place] [address]  Today's Date: 6/27/2025   Time Calculation  Start Time: 0935  Stop Time: 1002  Time Calculation (min): 27 min    Assessment/Plan   PT Assessment  PT Assessment Results: Impaired balance, Decreased mobility, Impaired vision, Decreased coordination, Decreased endurance, Decreased safety awareness  Rehab Prognosis: Good  Barriers to Discharge Home: Caregiver assistance, Cognition needs, Physical needs  Caregiver Assistance: Caregiver assistance needed per identified barriers - however, level of patient's required assistance exceeds assistance available at home  Cognition Needs: 24hr supervision for safety awareness needed, Insight of patient limited regarding functional ability/needs  Physical Needs: Stair navigation into home limited by function/safety, Stair navigation to access bed limited by function/safety, Ambulating household distances limited by function/safety, Intermittent mobility assistance needed, High falls risk due to function or environment  Evaluation/Treatment Tolerance: Patient tolerated treatment well  Medical Staff Made Aware: Yes  Strengths: Ability to acquire knowledge  Barriers to Participation: Comorbidities  End of Session Communication: Bedside nurse, Care Coordinator  Assessment Comment: Pt presents today with BLE strength WFL, impaired balance, and fair activity tolerance. Pt is currently below the reported PLOF requiring min assist for standing balance and to safely complete ambulation trial. Continued PT would benefit the pt to address the above deficits and attempt stair training as medically safe/appropriate to reduce pt's risk of falls. Pt does not have 24 hr assistance at home requiring assistance with ambulation and stairs to navigate at home. At this time, pt is anticipated to benefit from high intensity therapy at D/C.  End of Session Patient  Position: Up in chair, Alarm on   IP OR SWING BED PT PLAN  Inpatient or Swing Bed: Inpatient  PT Plan  Treatment/Interventions: Bed mobility, Transfer training, Gait training, Stair training, Balance training, Neuromuscular re-education, Endurance training, Therapeutic exercise, Therapeutic activity, Home exercise program, Postural re-education  PT Plan: Ongoing PT  PT Frequency: 4 times per week (During this acute inpatient hospitalization)  PT Discharge Recommendations: High intensity level of continued care (Based on current functional status and rehab potential, patient is anticipated to tolerate and benefit from 5 or more days per week of skilled rehabilitative therapy after discharge from this acute inpatient hospitalization.)  Equipment Recommended upon Discharge: Wheeled walker  PT Recommended Transfer Status: Assist x1, Assistive device  PT - OK to Discharge: Yes (PT POC initiated today)      Subjective     PT Visit Info:  PT Received On: 06/27/25  General Visit Information:  General  Reason for Referral: 74 y.o. M presenting with c/o dizziness and vomiting  Referred By: KIRK Starks (APRN-CNP)  Past Medical History Relevant to Rehab: HTN, GERD  Family/Caregiver Present: No  Prior to Session Communication: Bedside nurse  Patient Position Received: Bed, 2 rail up, Alarm on  Preferred Learning Style: auditory, kinesthetic, visual  General Comment: Pt supine in bed upon PT arrival. Cleared to participate with RN, and agreeable to PT evaluation.  Home Living:  Home Living  Type of Home: House  Lives With: Other (Comment) (Cousin adn cousin's children)  Home Adaptive Equipment: None  Home Layout: One level, Stairs to alternate level with rails, Full bath main level  Alternate Level Stairs-Rails: Left  Alternate Level Stairs-Number of Steps: 13 to basement  Home Access: Stairs to enter with rails  Entrance Stairs-Rails: Left  Entrance Stairs-Number of Steps: 3  Bathroom Shower/Tub: Tub/shower unit  Bathroom  Toilet: Standard  Bathroom Equipment: None  Bathroom Accessibility: Bathoom located on main level of home  Home Living Comments: Pt reports temporarily staying with cousin. Pt also reports sleeping on a mattress in the basement.  Prior Level of Function:  Prior Function Per Pt/Caregiver Report  Level of Dane: Independent with ADLs and functional transfers, Needs assistance with homemaking  Receives Help From: Family (Pt reports family is in/out of the house. Pt primarily stays in basement and is home alone during periods of the day.)  ADL Assistance: Independent  Meal Prep:  (Pt reports family completes meal prep)  Laundry:  (Pt does not complete laundry. 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 Pt reports some difficulty hearing but izaguirre not wear hearing aides  Medical Precautions: Fall precautions           Objective   Pain:  Pain Assessment  Pain Assessment: 0-10  0-10 (Numeric) Pain Score: 0 - No pain  Cognition:  Cognition  Orientation Level: Oriented X4  Insight: Mild  Impulsive: Within functional limits    General Assessments:  Activity Tolerance  Endurance: Tolerates 10 - 20 min exercise with multiple rests    Sensation  Light Touch: No apparent deficits    Perception  Inattention/Neglect: Cues to attend right visual field      Coordination  Movements are Fluid and Coordinated: No  Finger to Nose: Intact  Finger to Target: Dysmetria (Mild on LUE)    Postural Control  Head Control: Forward head posture in standing with gaze directed toward the ground. Pt able to  briefly correct with VC.    Static Sitting Balance  Static Sitting-Balance Support: Bilateral upper extremity supported, Feet supported  Static Sitting-Level of Assistance: Close supervision  Static Sitting-Comment/Number of Minutes: At the EOB about 3 minutes  Dynamic Sitting Balance  Dynamic Sitting-Balance Support: Bilateral upper extremity supported (Single foot supported on the ground)  Dynamic Sitting-Level of Assistance: Close supervision  Dynamic Sitting-Comments: During EOB LE strength testing AROM.    Static Standing Balance  Static Standing-Balance Support: Bilateral upper extremity supported  Static Standing-Level of Assistance: Contact guard  Static Standing-Comment/Number of Minutes: +Gait belt with FWW support  Dynamic Standing Balance  Dynamic Standing-Balance Support: Bilateral upper extremity supported  Dynamic Standing-Level of Assistance: Minimum assistance  Dynamic Standing-Balance: Turning  Dynamic Standing-Comments: +Gait belt with FWW support. Pt demonstrates decreased balance when looking forward with multiple instances of LOB requiring min assist.  Functional Assessments:  Bed Mobility  Bed Mobility: Yes  Bed Mobility 1  Bed Mobility 1: Supine to sitting  Level of Assistance 1: Distant supervision  Bed Mobility Comments 1: HOB flat. Pt's trunk propped up on pillows. Pt able to progress BLE off the EOB. t uses UE push on bed to raise trunk into urpight sitting.    Transfers  Transfer: Yes  Transfer 1  Transfer From 1: Bed to  Transfer to 1: Stand  Technique 1: Sit to stand  Transfer Device 1: Walker, Gait belt  Transfer Level of Assistance 1: Contact guard, Minimal verbal cues  Trials/Comments 1: VC for B hand placement on the bed instead of FWW for push to stand. Pt demonstrates instability during transfer. CGA provided via gait belt for safety.  Transfers 2  Transfer From 2: Stand to  Transfer to 2: Chair with arms  Technique 2: Stand to sit  Transfer Device 2: Walker, Gait belt  Transfer  Level of Assistance 2: Contact guard, Minimal verbal cues, Minimal tactile cues  Trials/Comments 2: VC for BLE positioning against the chair before initiating sitting. VC/TC for BUE reach for the armrests of the chair for controlled descent.    Stairs  Stairs: No  Extremity/Trunk Assessments:  RLE   RLE : Within Functional Limits  LLE   LLE : Within Functional Limits  Treatments:  Ambulation/Gait Training  Ambulation/Gait Training Performed: Yes  Ambulation/Gait Training 1  Surface 1: Level tile  Device 1: Rolling walker  Gait Support Devices: Gait belt  Assistance 1: Minimum assistance, Moderate verbal cues  Quality of Gait 1: Narrow base of support, Forward flexed posture, Inconsistent stride length  Comments/Distance (ft) 1: About 218 ft with a step-through pattern. Pt ambulates with narrow VASQUEZ, increased distance from FWW with B elbows in increased extension, and gaze directed toward the ground. VC for forward gaze and proper spacing from FWW. Pt demonstrates 4-5 instances of lateral LOB with forward gaze requiring assistance. Increased cueing required for obstacle avoidance with FWW primarily on pt's right side x 4.  Outcome Measures:  [facility] Basic Mobility  Turning from your back to your side while in a flat bed without using bedrails: A little  Moving from lying on your back to sitting on the side of a flat bed without using bedrails: A little  Moving to and from bed to chair (including a wheelchair): A little  Standing up from a chair using your arms (e.g. wheelchair or bedside chair): A little  To walk in hospital room: A little  Climbing 3-5 steps with railing: Total  Basic Mobility - Total Score: 16    Encounter Problems       Encounter Problems (Active)       Balance       STG - Maintains dynamic standing balance with upper extremity support       Start:  06/27/25    Expected End:  07/11/25       During 12-15 reps of dynamic standing or reaching activity with standby assist using wheeled walker without  LOB            Mobility       STG - Patient will ambulate       Start:  06/27/25    Expected End:  07/11/25       250 ft with standby assist using wheeled walker with proper gait mechanics and without LOB         STG - Patient will ascend and descend a flight of stairs with L HR support, LRAD, and standby assist       Start:  06/27/25    Expected End:  07/11/25       As medically safe/appropriate to attempt            PT Transfers       STG - Patient will perform bed mobility       Start:  06/27/25    Expected End:  07/11/25       IND from a flat bed without use of bed rails         STG - Patient will transfer sit to and from stand       Start:  06/27/25    Expected End:  07/11/25       Safely with standby assist using wheeled walker with proper body mechanics                Education Documentation  Precautions, taught by Isra Mark PT at 6/27/2025 11:01 AM.  Learner: Patient  Readiness: Acceptance  Method: Explanation, Demonstration  Response: Verbalizes Understanding    Body Mechanics, taught by Isra Mark PT at 6/27/2025 11:01 AM.  Learner: Patient  Readiness: Acceptance  Method: Explanation, Demonstration  Response: Verbalizes Understanding    Mobility Training, taught by Isra Mark PT at 6/27/2025 11:01 AM.  Learner: Patient  Readiness: Acceptance  Method: Explanation, Demonstration  Response: Verbalizes Understanding    Education Comments  No comments found.

## 2025-06-27 NOTE — CARE PLAN
The patient's goals for the shift include     The clinical goals for the shift include pt will remain safe and comfortable throughout shift    Over the shift, the patient did make progress toward the following goals.

## 2025-06-27 NOTE — CARE PLAN
The patient's goals for the shift include sleep    The clinical goals for the shift include Patient will remain safe.

## 2025-06-27 NOTE — PROGRESS NOTES
06/27/25 1133   Discharge Planning   Living Arrangements Family members  (cousin)   Support Systems Family members   Type of Residence Private residence   Number of Stairs to Enter Residence 3   Number of Stairs Within Residence 15   Do you have animals or pets at home? Yes   Type of Animals or Pets turtle   Home or Post Acute Services Post acute facilities (Rehab/SNF/etc)   Type of Post Acute Facility Services Rehab   Expected Discharge Disposition Rehab   Does the patient need discharge transport arranged? Yes   RoundTrip coordination needed? Yes   Has discharge transport been arranged? No   Financial Resource Strain   How hard is it for you to pay for the very basics like food, housing, medical care, and heating? Not hard   Housing Stability   In the last 12 months, was there a time when you were not able to pay the mortgage or rent on time? N   At any time in the past 12 months, were you homeless or living in a shelter (including now)? N   Transportation Needs   In the past 12 months, has lack of transportation kept you from medical appointments or from getting medications? no   In the past 12 months, has lack of transportation kept you from meetings, work, or from getting things needed for daily living? No     TCC met with pt at bedside. Explained my role as discharge planner. Pt lives with cousin. Walks without an assistive device. PT rec high. Pt wants to discuss with family going to rehab.

## 2025-06-27 NOTE — PROGRESS NOTES
Emergency Medicine Transition of Care Note.    I received Raymundo Herndon in signout from Dr. Harley.  Please see the previous ED provider note for all HPI, PE and MDM up to the time of signout at 8 PM. This is in addition to the primary record.    In brief Raymundo Herndon is an 74 y.o. 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      2. Mild diffuse narrowing of the P1 and P2 segment of the right PCA.  Otherwise no hemodynamically significant stenosis of the intracranial  vasculature.      3. Approximately 30% right and 40% left narrowing of the internal  carotid arteries in the neck.      Recommendation: If clinically concerned for small acute ischemic  infarct, further evaluation with MR of the brain without contrast is  recommended. [KD]   2131 MRI report read as reviewed.  Does not have any acute stroke.  No bleed reported.  Patient is ambulatory.  Tolerating p.o.  He can be safely discharged home plan for outpatient follow-up.  Return precautions are discussed. [PV]      ED Course User Index  [KD] Celeste Perez DO  [PV] Maico Chacon MD         Diagnoses as of 06/27/25 0356   Dizziness   Vertigo       Medical Decision Making  MRI read is negative for acute stroke.  He is ambulatory.  Tolerating p.o.  .  His workup for posterior stroke was negative.  Patient did have persistent vertigo.  Unable to ambulate.  He will require admission for further evaluation and management  Final diagnoses:   [R42] Dizziness           Procedure  Procedures    Maico Chacon MD

## 2025-06-28 ENCOUNTER — APPOINTMENT (OUTPATIENT)
Dept: CARDIOLOGY | Facility: HOSPITAL | Age: 75
End: 2025-06-28
Payer: MEDICARE

## 2025-06-28 VITALS
DIASTOLIC BLOOD PRESSURE: 74 MMHG | WEIGHT: 170 LBS | RESPIRATION RATE: 16 BRPM | HEIGHT: 69 IN | OXYGEN SATURATION: 96 % | HEART RATE: 77 BPM | SYSTOLIC BLOOD PRESSURE: 132 MMHG | BODY MASS INDEX: 25.18 KG/M2 | TEMPERATURE: 98.1 F

## 2025-06-28 LAB
ANION GAP SERPL CALC-SCNC: 14 MMOL/L (ref 10–20)
AORTIC VALVE MEAN GRADIENT: 9 MMHG
AORTIC VALVE PEAK VELOCITY: 1.99 M/S
AV PEAK GRADIENT: 16 MMHG
AVA (PEAK VEL): 1.99 CM2
AVA (VTI): 1.85 CM2
BUN SERPL-MCNC: 21 MG/DL (ref 6–23)
CALCIUM SERPL-MCNC: 8.9 MG/DL (ref 8.6–10.3)
CHLORIDE SERPL-SCNC: 106 MMOL/L (ref 98–107)
CHOLEST SERPL-MCNC: 194 MG/DL (ref 0–199)
CHOLESTEROL/HDL RATIO: 4.3
CO2 SERPL-SCNC: 23 MMOL/L (ref 21–32)
CREAT SERPL-MCNC: 1.35 MG/DL (ref 0.5–1.3)
EGFRCR SERPLBLD CKD-EPI 2021: 55 ML/MIN/1.73M*2
EJECTION FRACTION APICAL 4 CHAMBER: 64.4
EJECTION FRACTION: 68 %
ERYTHROCYTE [DISTWIDTH] IN BLOOD BY AUTOMATED COUNT: 13.2 % (ref 11.5–14.5)
FOLATE SERPL-MCNC: 8.3 NG/ML
GLUCOSE SERPL-MCNC: 92 MG/DL (ref 74–99)
HCT VFR BLD AUTO: 38.5 % (ref 41–52)
HDLC SERPL-MCNC: 44.6 MG/DL
HGB BLD-MCNC: 13.1 G/DL (ref 13.5–17.5)
LDLC SERPL CALC-MCNC: 138 MG/DL
LEFT ATRIUM VOLUME AREA LENGTH INDEX BSA: 25.6 ML/M2
LEFT VENTRICLE INTERNAL DIMENSION DIASTOLE: 3.59 CM (ref 3.5–6)
LEFT VENTRICULAR OUTFLOW TRACT DIAMETER: 1.95 CM
MCH RBC QN AUTO: 30.7 PG (ref 26–34)
MCHC RBC AUTO-ENTMCNC: 34 G/DL (ref 32–36)
MCV RBC AUTO: 90 FL (ref 80–100)
MITRAL VALVE E/A RATIO: 0.69
NON HDL CHOLESTEROL: 149 MG/DL (ref 0–149)
NRBC BLD-RTO: 0 /100 WBCS (ref 0–0)
PLATELET # BLD AUTO: 240 X10*3/UL (ref 150–450)
POTASSIUM SERPL-SCNC: 3.5 MMOL/L (ref 3.5–5.3)
RBC # BLD AUTO: 4.27 X10*6/UL (ref 4.5–5.9)
RIGHT VENTRICLE FREE WALL PEAK S': 15.92 CM/S
RIGHT VENTRICLE PEAK SYSTOLIC PRESSURE: 21 MMHG
SODIUM SERPL-SCNC: 139 MMOL/L (ref 136–145)
TRICUSPID ANNULAR PLANE SYSTOLIC EXCURSION: 2 CM
TRIGL SERPL-MCNC: 58 MG/DL (ref 0–149)
TSH SERPL-ACNC: 2.41 MIU/L (ref 0.44–3.98)
VIT B12 SERPL-MCNC: 290 PG/ML (ref 211–911)
VLDL: 12 MG/DL (ref 0–40)
WBC # BLD AUTO: 5.6 X10*3/UL (ref 4.4–11.3)

## 2025-06-28 PROCEDURE — 93306 TTE W/DOPPLER COMPLETE: CPT

## 2025-06-28 PROCEDURE — 84443 ASSAY THYROID STIM HORMONE: CPT | Performed by: INTERNAL MEDICINE

## 2025-06-28 PROCEDURE — 85027 COMPLETE CBC AUTOMATED: CPT | Performed by: NURSE PRACTITIONER

## 2025-06-28 PROCEDURE — 2500000004 HC RX 250 GENERAL PHARMACY W/ HCPCS (ALT 636 FOR OP/ED): Performed by: NURSE PRACTITIONER

## 2025-06-28 PROCEDURE — 2500000001 HC RX 250 WO HCPCS SELF ADMINISTERED DRUGS (ALT 637 FOR MEDICARE OP): Performed by: NURSE PRACTITIONER

## 2025-06-28 PROCEDURE — 80048 BASIC METABOLIC PNL TOTAL CA: CPT | Performed by: NURSE PRACTITIONER

## 2025-06-28 PROCEDURE — 80061 LIPID PANEL: CPT | Performed by: INTERNAL MEDICINE

## 2025-06-28 PROCEDURE — 82746 ASSAY OF FOLIC ACID SERUM: CPT | Mod: AHULAB | Performed by: INTERNAL MEDICINE

## 2025-06-28 PROCEDURE — 82607 VITAMIN B-12: CPT | Mod: AHULAB | Performed by: INTERNAL MEDICINE

## 2025-06-28 PROCEDURE — 2500000001 HC RX 250 WO HCPCS SELF ADMINISTERED DRUGS (ALT 637 FOR MEDICARE OP): Performed by: INTERNAL MEDICINE

## 2025-06-28 PROCEDURE — 99239 HOSP IP/OBS DSCHRG MGMT >30: CPT | Performed by: INTERNAL MEDICINE

## 2025-06-28 PROCEDURE — 93306 TTE W/DOPPLER COMPLETE: CPT | Performed by: STUDENT IN AN ORGANIZED HEALTH CARE EDUCATION/TRAINING PROGRAM

## 2025-06-28 PROCEDURE — 36415 COLL VENOUS BLD VENIPUNCTURE: CPT | Performed by: INTERNAL MEDICINE

## 2025-06-28 RX ORDER — MECLIZINE HYDROCHLORIDE 25 MG/1
25 TABLET ORAL 3 TIMES DAILY PRN
Qty: 30 TABLET | Refills: 0 | Status: SHIPPED | OUTPATIENT
Start: 2025-06-28

## 2025-06-28 RX ORDER — CLOPIDOGREL BISULFATE 75 MG/1
75 TABLET ORAL DAILY
Start: 2025-06-28 | End: 2025-07-19

## 2025-06-28 RX ORDER — ATORVASTATIN CALCIUM 80 MG/1
80 TABLET, FILM COATED ORAL NIGHTLY
Start: 2025-06-28

## 2025-06-28 RX ORDER — PANTOPRAZOLE SODIUM 40 MG/1
40 TABLET, DELAYED RELEASE ORAL
Start: 2025-06-29

## 2025-06-28 RX ORDER — ATORVASTATIN CALCIUM 40 MG/1
40 TABLET, FILM COATED ORAL NIGHTLY
Status: CANCELLED
Start: 2025-06-28

## 2025-06-28 RX ORDER — ATORVASTATIN CALCIUM 80 MG/1
80 TABLET, FILM COATED ORAL NIGHTLY
Status: DISCONTINUED | OUTPATIENT
Start: 2025-06-28 | End: 2025-06-28 | Stop reason: HOSPADM

## 2025-06-28 RX ORDER — NAPROXEN SODIUM 220 MG/1
81 TABLET, FILM COATED ORAL DAILY
Start: 2025-06-28

## 2025-06-28 RX ADMIN — ENOXAPARIN SODIUM 40 MG: 40 INJECTION SUBCUTANEOUS at 05:44

## 2025-06-28 RX ADMIN — ASPIRIN 81 MG: 81 TABLET, CHEWABLE ORAL at 09:44

## 2025-06-28 RX ADMIN — PANTOPRAZOLE SODIUM 40 MG: 40 TABLET, DELAYED RELEASE ORAL at 05:44

## 2025-06-28 RX ADMIN — CLOPIDOGREL 75 MG: 75 TABLET ORAL at 09:44

## 2025-06-28 ASSESSMENT — COGNITIVE AND FUNCTIONAL STATUS - GENERAL
CLIMB 3 TO 5 STEPS WITH RAILING: A LITTLE
MOBILITY SCORE: 20
MOVING TO AND FROM BED TO CHAIR: A LITTLE
DAILY ACTIVITIY SCORE: 24
WALKING IN HOSPITAL ROOM: A LITTLE
STANDING UP FROM CHAIR USING ARMS: A LITTLE

## 2025-06-28 ASSESSMENT — PAIN SCALES - GENERAL: PAINLEVEL_OUTOF10: 0 - NO PAIN

## 2025-06-28 ASSESSMENT — PAIN - FUNCTIONAL ASSESSMENT: PAIN_FUNCTIONAL_ASSESSMENT: 0-10

## 2025-06-28 NOTE — PROGRESS NOTES
"Raymundo Herndon is a 74 y.o. male on day 1 of admission presenting with Dizziness.    Subjective   Patient was admitted with dizziness vertigo nausea and vomiting and unsteady gait and difficulty ambulation.  Patient seen and examined.  He is still dizzy and has vertigo intermittently and unsteady on his feet and walking with a walker.  Seen by neurology and advised to have vestibular exercises as well as has started on aspirin and Plavix for possibility of a stroke although his symptoms are completely resolved and MRI is negative.  Blood pressures are stable.  No other new problems.       Objective     Physical Exam  GENERAL:  Alert, no distress, cooperative  SKIN:  Skin color, texture, turgor normal. No rashes or lesions.  OROPHARYNX:  Lips, mucosa, and tongue are normal.Teeth and gums, normal. Oropharynx normal.  NECK:  No jugulovenous distention, No carotid bruits, Carotid pulse normal contour, Supple  LUNGS:  Lungs clear to auscultation. Good diaphragmatic excursion.  CARDIAC:  Normal S1 and S2; no rubs, murmurs, or gallops  ABDOMEN:  Abdomen soft, non-tender, BS normal, No masses or organomegaly  EXTREMETIES:  Extremities normal, no deformities, edema, clubbing or skin discoloration. Good capillary refill., No ulcers  NEURO:  Alert, oriented X 3, Gait not examined, nonfocal, patient does have a gait ataxia.  See detailed neurological exam.  PULSES:  2+ radial, 2+ carotid    Last Recorded Vitals  Blood pressure 141/75, pulse 76, temperature 36.7 °C (98.1 °F), temperature source Temporal, resp. rate 16, height 1.753 m (5' 9\"), weight 77.1 kg (170 lb), SpO2 98%.  Intake/Output last 3 Shifts:  I/O last 3 completed shifts:  In: 412.5 (5.3 mL/kg) [I.V.:412.5 (5.3 mL/kg)]  Out: - (0 mL/kg)   Weight: 77.1 kg     Relevant Results  Scheduled medications  aspirin, 81 mg, oral, Daily  atorvastatin, 80 mg, oral, Nightly  clopidogrel, 75 mg, oral, Daily  enoxaparin, 40 mg, subcutaneous, q24h  pantoprazole, 40 mg, oral, Daily " before breakfast   Or  pantoprazole, 40 mg, intravenous, Daily before breakfast    Continuous medications  Continuous Medications[1]  PRN medications  PRN Medications[2]          Results for orders placed or performed during the hospital encounter of 06/26/25 (from the past 96 hours)   ECG 12 lead   Result Value Ref Range    Ventricular Rate 91 BPM    Atrial Rate 91 BPM    MT Interval 136 ms    QRS Duration 138 ms    QT Interval 390 ms    QTC Calculation(Bazett) 479 ms    P Axis 78 degrees    R Axis -83 degrees    T Axis 53 degrees    QRS Count 15 beats    Q Onset 228 ms    P Onset 160 ms    P Offset 210 ms    T Offset 423 ms    QTC Fredericia 448 ms   CBC and Auto Differential   Result Value Ref Range    WBC 6.8 4.4 - 11.3 x10*3/uL    nRBC 0.0 0.0 - 0.0 /100 WBCs    RBC 4.42 (L) 4.50 - 5.90 x10*6/uL    Hemoglobin 13.3 (L) 13.5 - 17.5 g/dL    Hematocrit 40.1 (L) 41.0 - 52.0 %    MCV 91 80 - 100 fL    MCH 30.1 26.0 - 34.0 pg    MCHC 33.2 32.0 - 36.0 g/dL    RDW 12.8 11.5 - 14.5 %    Platelets 239 150 - 450 x10*3/uL    Neutrophils % 85.0 40.0 - 80.0 %    Immature Granulocytes %, Automated 0.6 0.0 - 0.9 %    Lymphocytes % 9.8 13.0 - 44.0 %    Monocytes % 4.5 2.0 - 10.0 %    Eosinophils % 0.0 0.0 - 6.0 %    Basophils % 0.1 0.0 - 2.0 %    Neutrophils Absolute 5.81 (H) 1.60 - 5.50 x10*3/uL    Immature Granulocytes Absolute, Automated 0.04 0.00 - 0.50 x10*3/uL    Lymphocytes Absolute 0.67 (L) 0.80 - 3.00 x10*3/uL    Monocytes Absolute 0.31 0.05 - 0.80 x10*3/uL    Eosinophils Absolute 0.00 0.00 - 0.40 x10*3/uL    Basophils Absolute 0.01 0.00 - 0.10 x10*3/uL   Comprehensive metabolic panel   Result Value Ref Range    Glucose 169 (H) 74 - 99 mg/dL    Sodium 139 136 - 145 mmol/L    Potassium 4.2 3.5 - 5.3 mmol/L    Chloride 102 98 - 107 mmol/L    Bicarbonate 28 21 - 32 mmol/L    Anion Gap 13 10 - 20 mmol/L    Urea Nitrogen 15 6 - 23 mg/dL    Creatinine 1.30 0.50 - 1.30 mg/dL    eGFR 57 (L) >60 mL/min/1.73m*2    Calcium 9.2 8.6  - 10.3 mg/dL    Albumin 4.4 3.4 - 5.0 g/dL    Alkaline Phosphatase 77 33 - 136 U/L    Total Protein 7.5 6.4 - 8.2 g/dL    AST 19 9 - 39 U/L    Bilirubin, Total 0.8 0.0 - 1.2 mg/dL    ALT 10 10 - 52 U/L   Troponin I, High Sensitivity   Result Value Ref Range    Troponin I, High Sensitivity 10 0 - 20 ng/L   Protime-INR   Result Value Ref Range    Protime 11.6 9.8 - 12.4 seconds    INR 1.1 0.9 - 1.1   APTT   Result Value Ref Range    aPTT 22 (L) 26 - 36 seconds   POCT GLUCOSE   Result Value Ref Range    POCT Glucose 160 (H) 74 - 99 mg/dL   Urinalysis with Reflex Culture and Microscopic   Result Value Ref Range    Color, Urine Light-Yellow Light-Yellow, Yellow, Dark-Yellow    Appearance, Urine Clear Clear    Specific Gravity, Urine 1.029 1.005 - 1.035    pH, Urine 6.5 5.0, 5.5, 6.0, 6.5, 7.0, 7.5, 8.0    Protein, Urine 100 (2+) (A) NEGATIVE, 10 (TRACE), 20 (TRACE) mg/dL    Glucose, Urine 50 (TRACE) (A) Normal mg/dL    Blood, Urine 0.06 (1+) (A) NEGATIVE mg/dL    Ketones, Urine TRACE (A) NEGATIVE mg/dL    Bilirubin, Urine NEGATIVE NEGATIVE mg/dL    Urobilinogen, Urine Normal Normal mg/dL    Nitrite, Urine NEGATIVE NEGATIVE    Leukocyte Esterase, Urine NEGATIVE NEGATIVE   Extra Urine Gray Tube   Result Value Ref Range    Extra Tube     Urinalysis Microscopic   Result Value Ref Range    WBC, Urine 1-5 1-5, NONE /HPF    RBC, Urine NONE NONE, 1-2, 3-5 /HPF    Mucus, Urine FEW Reference range not established. /LPF   SST TOP   Result Value Ref Range    Extra Tube Hold for add-ons.    Hemoglobin A1C   Result Value Ref Range    Hemoglobin A1C 5.3 See comment %    Estimated Average Glucose 105 Not Established mg/dL   CBC   Result Value Ref Range    WBC 5.6 4.4 - 11.3 x10*3/uL    nRBC 0.0 0.0 - 0.0 /100 WBCs    RBC 4.27 (L) 4.50 - 5.90 x10*6/uL    Hemoglobin 13.1 (L) 13.5 - 17.5 g/dL    Hematocrit 38.5 (L) 41.0 - 52.0 %    MCV 90 80 - 100 fL    MCH 30.7 26.0 - 34.0 pg    MCHC 34.0 32.0 - 36.0 g/dL    RDW 13.2 11.5 - 14.5 %     Platelets 240 150 - 450 x10*3/uL   Basic metabolic panel   Result Value Ref Range    Glucose 92 74 - 99 mg/dL    Sodium 139 136 - 145 mmol/L    Potassium 3.5 3.5 - 5.3 mmol/L    Chloride 106 98 - 107 mmol/L    Bicarbonate 23 21 - 32 mmol/L    Anion Gap 14 10 - 20 mmol/L    Urea Nitrogen 21 6 - 23 mg/dL    Creatinine 1.35 (H) 0.50 - 1.30 mg/dL    eGFR 55 (L) >60 mL/min/1.73m*2    Calcium 8.9 8.6 - 10.3 mg/dL   TSH with reflex to Free T4 if abnormal   Result Value Ref Range    Thyroid Stimulating Hormone 2.41 0.44 - 3.98 mIU/L   Lipid panel   Result Value Ref Range    Cholesterol 194 0 - 199 mg/dL    HDL-Cholesterol 44.6 mg/dL    Cholesterol/HDL Ratio 4.3     LDL Calculated 138 (H) <=99 mg/dL    VLDL 12 0 - 40 mg/dL    Triglycerides 58 0 - 149 mg/dL    Non HDL Cholesterol 149 0 - 149 mg/dL   Transthoracic Echo Complete   Result Value Ref Range    BSA 1.94 m2    MR brain wo IV contrast  Result Date: 6/26/2025  Interpreted By:  Roshan Vasquez, STUDY: MR BRAIN WO IV CONTRAST;  6/26/2025 8:24 pm   INDICATION: Signs/Symptoms:dizziness with gait abnormality and nystagmus.     COMPARISON: None.   ACCESSION NUMBER(S): [id number]   ORDERING CLINICIAN: DARIAN JONES   TECHNIQUE: Multiplanar, multi-sequence images of the brain were obtained without contrast.   FINDINGS:   Diffusion weighted images show no evidence of acute ischemic infarct.   The major intracranial flow voids are preserved.   Moderate periventricular and subcortical hemispheric T2/FLAIR white matter hyperintensities are most compatible with chronic small vessel ischemic disease.   There is no pathologic susceptibility artifact on GRE images.   There is no acute intraparenchymal hemorrhage, mass, mass-effect, or an extra-axial fluid collection.   The ventricular size and cerebral volume are age-concordant.   Normal morphology of midline structures. The craniovertebral junction is normal.   The orbits and globes are unremarkable. Left lens replacement noted.    The paranasal sinuses show no air-fluid levels or hemorrhage.   The mastoid air cells are clear.       No acute ischemic infarct, acute hemorrhage, or intracranial mass effect.   Moderate burden of supratentorial chronic small vessel ischemic disease.   MACRO: None.   Signed by: Roshan Vasquez 6/26/2025 9:02 PM Dictation workstation:   [id number]    CT angio head and neck w and wo IV contrast  Result Date: 6/26/2025  Interpreted By:  Jorge Alberto Velásquez, STUDY: CT ANGIO HEAD AND NECK W AND WO IV CONTRAST;  6/26/2025 3:45 pm   INDICATION: Signs/Symptoms:Dizziness with nausea, since 2am.     COMPARISON: None.   ACCESSION NUMBER(S): [id number]   ORDERING CLINICIAN: GAYLE MCCALLUM   TECHNIQUE: Unenhanced CT images of the head were obtained. Subsequently, 75 ML of Omnipaque 350 was administered intravenously and axial images of the head and neck were acquired.  Coronal, sagittal, and 3-D reconstructions were provided for review.   FINDINGS: CTA HEAD FINDINGS:   Anterior circulation: There is a 3.3 mm laterally projecting outpouching arising from the supraclinoid segment of the left internal carotid artery, likely small aneurysm. Otherwise bilateral intracranial internal carotid arteries, bilateral carotid terminals, bilateral proximal anterior and middle cerebral arteries are patent without hemodynamically significant stenosis.   Posterior circulation: Mild diffuse narrowing of the P1 and P2 segment of the right PCA. Bilateral intracranial vertebral arteries, vertebrobasilar junction, basilar artery and left proximal posterior cerebral arteries are normal.   CTA NECK FINDINGS:   Right carotid vessels: The common carotid artery is normal. There is atherosclerotic calcification of the right carotid bifurcation, extending to the right proximal internal carotid artery resulting in narrowing of the lumen measuring up to 3.0 mm in compared to distal segment measuring up to 4.5 mm, compatible with approximately 30%  stenosis.   Left carotid vessels: There is atherosclerotic calcification of the left carotid bifurcation, extending to the left proximal internal carotid artery, resulting in narrowing of the lumen measuring 3 mm in compared to distal segment measuring up to 5 mm, compatible with 40% stenosis.   Vertebral vessels: Dominant right vertebral artery. The visualized segments of the cervical vertebral arteries are normal in caliber.       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   2. Mild diffuse narrowing of the P1 and P2 segment of the right PCA. Otherwise no hemodynamically significant stenosis of the intracranial vasculature.   3. Approximately 30% right and 40% left narrowing of the internal carotid arteries in the neck.   Recommendation: If clinically concerned for small acute ischemic infarct, further evaluation with MR of the brain without contrast is recommended.   MACRO: None   Signed by: Jorge Alberto Velásquez 6/26/2025 4:16 PM Dictation workstation:   [id number]    ECG 12 lead  Result Date: 6/26/2025  Normal sinus rhythm Right atrial enlargement Left axis deviation Right bundle branch block Abnormal ECG No previous ECGs available                    Assessment & Plan  Dizziness  74-year-old gentleman with no significant past medical history except gastroesophageal reflux for which he takes aluminum hydroxide.Is admitted with 2 or 3 days history of lightheadedness vomiting.  Patient also stated that he is very unsteady and off balance and has difficulty ambulation which is fairly acute only for the last 2 days and no history of trauma  1.  New onset lightheadedness and dizziness with vomiting etiology unclear could be benign positional vertigo, PT OT and vestibular exercise  Neurology consultation appreciated and recommendations noted.     2.  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  Possibility of a stroke/TIA exist as per neurologist and patient has been started on aspirin 81 mg daily and Plavix 75 mg daily, Plavix could be discontinued after 3 weeks.     3.  Mild hypertension with blood pressure 152/80 mmHg, will observe closely.    4.  Hyperlipidemia with LDL of 138, patient started on atorvastatin 80 mg daily.        All the lab work are normal and EKG revealed complete right bundle branch block.  Etiology of his unsteady gait and difficulty ambulation is not clear.  He has been started on PT OT who suggested that he would need high intensity PT OT and acute rehab.  Neurology consultation and recommendations carried out.  Serum B12, folate results are pending, and TSH level is normal.  Patient denies any history of alcohol abuse or alcohol indulgence and he does not smoke.  Patient had transthoracic echocardiogram today with results of which are pending.  He is also advised to have a Holter monitor as an outpatient since being his surgery date could not be placed today.     I personally examined him and reviewed his labs and imaging studies including CT of the brain and MRI.  Discussed the plan of treatment with patient in detail.  DVT prophylaxis, PT OT .    Stable for discharge to acute rehab today.           I spent 35 minutes in the professional and overall care of this patient.  Addendum echocardiogram was completed today which revealed normal left ventricular systolic function andsignificant valvular abnormalities.  And has moderate diastolic dysfunction.    Rodrick Mcmanus MD         [1]    [2] PRN medications: acetaminophen **OR** acetaminophen **OR** acetaminophen, meclizine, ondansetron **OR** ondansetron, polyethylene glycol

## 2025-06-28 NOTE — PROGRESS NOTES
06/28/25 1436   Discharge Planning   Home or Post Acute Services Post acute facilities (Rehab/SNF/etc)   Type of Post Acute Facility Services Rehab   Expected Discharge Disposition Rehab  ([facility])   Does the patient need discharge transport arranged? Yes   RoundTrip coordination needed? Yes   Has discharge transport been arranged? Yes   What day is the transport expected? 06/28/25   What time is the transport expected? 1800  (report: [phone]. facility, care team and daughter aware. DSC will send dc paperwork to facility.)

## 2025-06-28 NOTE — CARE PLAN
The patient's goals for the shift include sleep    The clinical goals for the shift include pt will remain safe and comfortable throughout shift

## 2025-06-28 NOTE — CARE PLAN
The patient's goals for the shift include sleep    The clinical goals for the shift include Will remain safe.    Over the shift, the patient did make progress toward the following goals.   Problem: Discharge Planning  Goal: Discharge to home or other facility with appropriate resources  Outcome: Progressing     Problem: Chronic Conditions and Co-morbidities  Goal: Patient's chronic conditions and co-morbidity symptoms are monitored and maintained or improved  Outcome: Progressing     Problem: Nutrition  Goal: Nutrient intake appropriate for maintaining nutritional needs  Outcome: Progressing

## 2025-06-28 NOTE — CONSULTS
"Consults    History Of Present Illness  Raymundo Herndon is a 74 y.o. male presenting with acute onset of dizziness and vomiting nausea and having off balance.  Day prior to presentation he woke up at night with the symptoms.  He had to crawl on the floor to get to commode.  Then he got back to his floor but clearly get difficulty walking.  Subsequently this lasted 2 to 3 days he had intermittent vomiting and unsteady gait difficulty ambulating with some position sensitivity from head position change.  Came to ED head CT revealed 3.3 laterally projecting saccular aneurysm arising from subarachnoid segment of left internal carotid.  MRI of the brain revealed no intracranial pathology but was unsteady and had some gait ataxia at that time hence came to floor neurology was subsequently consulted..  Past Medical History  Medical History[1]  Surgical History  Surgical History[2]  Social History  Social History[3]  Allergies  Patient has no known allergies.  Prescriptions Prior to Admission[4]    Review of Systems as noted in HPI  Neurological Exam alert oriented x 4, extraocular movements full, saccades are normal in velocity and amplitude.  VOR is intact.  There is nystagmus on supine and right or left ear down supine it is horizontal gaze evoked while right ear down is counterclockwise beating and up beating nystagmus with right ear down and direction changes with the left ear down.  On upright no nystagmus.  There is no habituation of nystagmus.  Sensations on face is intact arm sensations intact bilaterally with touch and tactile.  No ataxia or tremor noted today gait is clearly difficult.  Physical Exam  Last Recorded Vitals  Blood pressure 152/82, pulse 69, temperature 36.5 °C (97.7 °F), temperature source Temporal, resp. rate 17, height 1.753 m (5' 9\"), weight 77.1 kg (170 lb), SpO2 100%.    Relevant Results  Results for orders placed or performed during the hospital encounter of 06/26/25 (from the past 24 hours)   SST " TOP   Result Value Ref Range    Extra Tube Hold for add-ons.    Hemoglobin A1C   Result Value Ref Range    Hemoglobin A1C 5.3 See comment %    Estimated Average Glucose 105 Not Established mg/dL         I have personally reviewed the following imaging results:   Imaging  MR brain wo IV contrast  Result Date: 6/26/2025  No acute ischemic infarct, acute hemorrhage, or intracranial mass effect.   Moderate burden of supratentorial chronic small vessel ischemic disease.   MACRO: None.   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   2. Mild diffuse narrowing of the P1 and P2 segment of the right PCA. Otherwise no hemodynamically significant stenosis of the intracranial vasculature.   3. Approximately 30% right and 40% left narrowing of the internal carotid arteries in the neck.   Recommendation: If clinically concerned for small acute ischemic infarct, further evaluation with MR of the brain without contrast is recommended.   MACRO: None   Signed by: Jorge Alberto Velásquez 6/26/2025 4:16 PM Dictation workstation:   [id number]      Cardiology, Vascular, and Other Imaging       Assessment/Plan   The patient is a 74-year-old man with a history of dizziness and lightheadedness and nausea and vomiting acute in onset persistent but improved significantly over time.  Here for further evaluation MRI unremarkable.  Although this seems to be benign paroxysmal positional vertigo there is lack of habituation and his inability to walk initially is also concerning.  Given the risk factors we will start him on aspirin 81 mg daily and Plavix 75 mg daily along with atorvastatin 80 mg daily he would benefit with echocardiogram with bubble study as well and Holter monitoring on discharge.  If echo  cannot be done over weekend can be done as outpatient with outpatient follow-up.  Outpatient vestibular therapy will also be beneficial.  Although MRI did not reveal stroke the concern here is either some resolution stroke or prolonged TIA.    I spent 60 minutes in the professional and overall care of this patient.      Aasef G Shaikh, MD PhD       [1]   Past Medical History:  Diagnosis Date    Encounter for screening for other viral diseases 11/27/2018    Need for hepatitis C screening test    Immunization not carried out because of patient refusal 01/28/2019    Vaccination not carried out because of patient refusal    Other specified health status 10/25/2016    Known health problems: none    Personal history of other diseases of urinary system 03/26/2020    History of chronic kidney disease   [2]   Past Surgical History:  Procedure Laterality Date    CATARACT EXTRACTION  10/25/2016    Cataract Extraction   [3]   Social History  Tobacco Use    Smoking status: Never    Smokeless tobacco: Never   [4]   Medications Prior to Admission   Medication Sig Dispense Refill Last Dose/Taking    aluminum hydrox-magnesium carb 160-105 mg tablet,chewable Chew and swallow 2 tablets every 12 hours if needed (for dyspepsia).   6/26/2025 Morning

## 2025-06-28 NOTE — H&P
History Of Present Illness  Raymundo Herndon is a 74 y.o. male presenting with dizziness, lightheadedness and vomiting.  And I have off-balance.    74-year-old male with no significant past medical history and does not take any medications at home except antacid, stated that for last 2 or 3 days she has had lightheadedness and intermittent vomiting and is unsteady on his legs and has difficulty ambulation and therefore he came to the emergency room.  An examination in the ER was unremarkable.  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  Subsequently patient an MRI of the brain which revealed no acute intracranial pathology.  However patient is very unsteady on his feet and has gait ataxia and according to PT OT needs high intensity PT OT.  I have requested neurological consultation.  Past Medical History  He has a past medical history of Encounter for screening for other viral diseases (11/27/2018), Immunization not carried out because of patient refusal (01/28/2019), Other specified health status (10/25/2016), and Personal history of other diseases of urinary system (03/26/2020).    Surgical History  He has a past surgical history that includes Cataract extraction (10/25/2016).     Social History  He reports that he has never smoked. He has never used smokeless tobacco. No history on file for alcohol use and drug use.    Family History  Family History[1]     Allergies  Patient has no known allergies.    Review of Systems  COMPLETE REVIEW OF SYSTEMS:    GENERAL: No weight loss, malaise or fevers., SEE HPI  HEENT: Positive for dizziness and lightheadedness.  Negative for frequent or significant headaches, No changes in hearing or vision, no nose bleeds or other nasal problems  NECK: Negative for lumps, goiter, pain and significant neck swelling  RESPIRATORY: Negative for cough, wheezing or shortness of  breath.  CARDIOVASCULAR: Negative for chest pain, leg swelling or palpitations.  GI: Negative for abdominal discomfort, blood in stools or black stools or change in bowel habits  : No history of dysuria, frequency or incontinence  MUSCULOSKELETAL: Negative for joint pain or swelling, back pain or muscle pain.  SKIN: Negative for lesions, rash, and itching.  PSYCH: Negative for sleep disturbance, mood disorder and recent psychosocial stressors.  HEMATOLOGY/LYMPHOLOGY: Negative for prolonged bleeding, bruising easily or swollen nodes.  ENDOCRINE: Negative for cold or heat intolerance, polyuria, polydipsia and goiter.  NEURO: Unsteadiness and difficulty ambulating.  All other reviewed and negative other than HPI.         Physical Exam  GENERAL: healthy, alert, moderate distress, cooperative,, afebrile  SKIN: Skin color, texture, turgor normal. No rashes or lesions.  HEAD/SINUSES: No significant findings.  EYES: PERRLA and EOMI, no nystagmus  EARS: external ears normal  NOSE:  Septum midline.  OROPHARYNX: Lips, mucosa, and tongue normal. Teeth and gums normal. Oropharynx normal.  NECK: no jugulovenous distention, no carotid bruits, carotid pulse normal contour, supple  BACK: Back symmetric, Normal curvature, ROM normal, No CVAT.  LUNGS: Lungs clear to auscultation. Good diaphragmatic excursion.  CARDIAC: Rate and rhythm is regular.  Normal S1 and S2; no rubs, murmurs, or gallops  ABDOMEN: Abdomen soft, non-tender. BS normal. No masses or organomegaly.  EXTREMITIES: Extremities normal. No deformities, edema, clubbing or skin discoloration.  NEURO: Gait  very unsteady and could not take more than 1 or 2 steps.  Reflexes normal and symmetric. , Cranial nerves II-XII intact.  Has gait ataxia  PULSES: 2+ radial, 2+ carotid  RECTAL: not done  The remainder of the physical exam is noncontributory.          Last Recorded Vitals  /82 (BP Location: Right arm, Patient Position: Lying)   Pulse 69   Temp 36.5 °C (97.7 °F)  (Temporal)   Resp 17   Wt 77.1 kg (170 lb)   SpO2 100%     Relevant Results  Scheduled medications  enoxaparin, 40 mg, subcutaneous, q24h  pantoprazole, 40 mg, oral, Daily before breakfast   Or  pantoprazole, 40 mg, intravenous, Daily before breakfast    Continuous medications  Continuous Medications[2]  PRN medications  PRN Medications[3]      Results for orders placed or performed during the hospital encounter of 06/26/25 (from the past 96 hours)   ECG 12 lead   Result Value Ref Range    Ventricular Rate 91 BPM    Atrial Rate 91 BPM    NC Interval 136 ms    QRS Duration 138 ms    QT Interval 390 ms    QTC Calculation(Bazett) 479 ms    P Axis 78 degrees    R Axis -83 degrees    T Axis 53 degrees    QRS Count 15 beats    Q Onset 228 ms    P Onset 160 ms    P Offset 210 ms    T Offset 423 ms    QTC Fredericia 448 ms   CBC and Auto Differential   Result Value Ref Range    WBC 6.8 4.4 - 11.3 x10*3/uL    nRBC 0.0 0.0 - 0.0 /100 WBCs    RBC 4.42 (L) 4.50 - 5.90 x10*6/uL    Hemoglobin 13.3 (L) 13.5 - 17.5 g/dL    Hematocrit 40.1 (L) 41.0 - 52.0 %    MCV 91 80 - 100 fL    MCH 30.1 26.0 - 34.0 pg    MCHC 33.2 32.0 - 36.0 g/dL    RDW 12.8 11.5 - 14.5 %    Platelets 239 150 - 450 x10*3/uL    Neutrophils % 85.0 40.0 - 80.0 %    Immature Granulocytes %, Automated 0.6 0.0 - 0.9 %    Lymphocytes % 9.8 13.0 - 44.0 %    Monocytes % 4.5 2.0 - 10.0 %    Eosinophils % 0.0 0.0 - 6.0 %    Basophils % 0.1 0.0 - 2.0 %    Neutrophils Absolute 5.81 (H) 1.60 - 5.50 x10*3/uL    Immature Granulocytes Absolute, Automated 0.04 0.00 - 0.50 x10*3/uL    Lymphocytes Absolute 0.67 (L) 0.80 - 3.00 x10*3/uL    Monocytes Absolute 0.31 0.05 - 0.80 x10*3/uL    Eosinophils Absolute 0.00 0.00 - 0.40 x10*3/uL    Basophils Absolute 0.01 0.00 - 0.10 x10*3/uL   Comprehensive metabolic panel   Result Value Ref Range    Glucose 169 (H) 74 - 99 mg/dL    Sodium 139 136 - 145 mmol/L    Potassium 4.2 3.5 - 5.3 mmol/L    Chloride 102 98 - 107 mmol/L    Bicarbonate  28 21 - 32 mmol/L    Anion Gap 13 10 - 20 mmol/L    Urea Nitrogen 15 6 - 23 mg/dL    Creatinine 1.30 0.50 - 1.30 mg/dL    eGFR 57 (L) >60 mL/min/1.73m*2    Calcium 9.2 8.6 - 10.3 mg/dL    Albumin 4.4 3.4 - 5.0 g/dL    Alkaline Phosphatase 77 33 - 136 U/L    Total Protein 7.5 6.4 - 8.2 g/dL    AST 19 9 - 39 U/L    Bilirubin, Total 0.8 0.0 - 1.2 mg/dL    ALT 10 10 - 52 U/L   Troponin I, High Sensitivity   Result Value Ref Range    Troponin I, High Sensitivity 10 0 - 20 ng/L   Protime-INR   Result Value Ref Range    Protime 11.6 9.8 - 12.4 seconds    INR 1.1 0.9 - 1.1   APTT   Result Value Ref Range    aPTT 22 (L) 26 - 36 seconds   POCT GLUCOSE   Result Value Ref Range    POCT Glucose 160 (H) 74 - 99 mg/dL   Urinalysis with Reflex Culture and Microscopic   Result Value Ref Range    Color, Urine Light-Yellow Light-Yellow, Yellow, Dark-Yellow    Appearance, Urine Clear Clear    Specific Gravity, Urine 1.029 1.005 - 1.035    pH, Urine 6.5 5.0, 5.5, 6.0, 6.5, 7.0, 7.5, 8.0    Protein, Urine 100 (2+) (A) NEGATIVE, 10 (TRACE), 20 (TRACE) mg/dL    Glucose, Urine 50 (TRACE) (A) Normal mg/dL    Blood, Urine 0.06 (1+) (A) NEGATIVE mg/dL    Ketones, Urine TRACE (A) NEGATIVE mg/dL    Bilirubin, Urine NEGATIVE NEGATIVE mg/dL    Urobilinogen, Urine Normal Normal mg/dL    Nitrite, Urine NEGATIVE NEGATIVE    Leukocyte Esterase, Urine NEGATIVE NEGATIVE   Extra Urine Gray Tube   Result Value Ref Range    Extra Tube     Urinalysis Microscopic   Result Value Ref Range    WBC, Urine 1-5 1-5, NONE /HPF    RBC, Urine NONE NONE, 1-2, 3-5 /HPF    Mucus, Urine FEW Reference range not established. /LPF   SST TOP   Result Value Ref Range    Extra Tube Hold for add-ons.    Hemoglobin A1C   Result Value Ref Range    Hemoglobin A1C 5.3 See comment %    Estimated Average Glucose 105 Not Established mg/dL      MR brain wo IV contrast  Result Date: 6/26/2025  Interpreted By:  Roshan Vasquez, STUDY: MR BRAIN WO IV CONTRAST;  6/26/2025 8:24 pm    INDICATION: Signs/Symptoms:dizziness with gait abnormality and nystagmus.     COMPARISON: None.   ACCESSION NUMBER(S): [id number]   ORDERING CLINICIAN: DARIAN JONES   TECHNIQUE: Multiplanar, multi-sequence images of the brain were obtained without contrast.   FINDINGS:   Diffusion weighted images show no evidence of acute ischemic infarct.   The major intracranial flow voids are preserved.   Moderate periventricular and subcortical hemispheric T2/FLAIR white matter hyperintensities are most compatible with chronic small vessel ischemic disease.   There is no pathologic susceptibility artifact on GRE images.   There is no acute intraparenchymal hemorrhage, mass, mass-effect, or an extra-axial fluid collection.   The ventricular size and cerebral volume are age-concordant.   Normal morphology of midline structures. The craniovertebral junction is normal.   The orbits and globes are unremarkable. Left lens replacement noted.   The paranasal sinuses show no air-fluid levels or hemorrhage.   The mastoid air cells are clear.       No acute ischemic infarct, acute hemorrhage, or intracranial mass effect.   Moderate burden of supratentorial chronic small vessel ischemic disease.   MACRO: None.   Signed by: Roshan Vasquez 6/26/2025 9:02 PM Dictation workstation:   [id number]    CT angio head and neck w and wo IV contrast  Result Date: 6/26/2025  Interpreted By:  Jorge Alberto Velásquez, STUDY: CT ANGIO HEAD AND NECK W AND WO IV CONTRAST;  6/26/2025 3:45 pm   INDICATION: Signs/Symptoms:Dizziness with nausea, since 2am.     COMPARISON: None.   ACCESSION NUMBER(S): [id number]   ORDERING CLINICIAN: GAYLE MCCALLUM   TECHNIQUE: Unenhanced CT images of the head were obtained. Subsequently, 75 ML of Omnipaque 350 was administered intravenously and axial images of the head and neck were acquired.  Coronal, sagittal, and 3-D reconstructions were provided for review.   FINDINGS: CTA HEAD FINDINGS:   Anterior circulation: There is a  3.3 mm laterally projecting outpouching arising from the supraclinoid segment of the left internal carotid artery, likely small aneurysm. Otherwise bilateral intracranial internal carotid arteries, bilateral carotid terminals, bilateral proximal anterior and middle cerebral arteries are patent without hemodynamically significant stenosis.   Posterior circulation: Mild diffuse narrowing of the P1 and P2 segment of the right PCA. Bilateral intracranial vertebral arteries, vertebrobasilar junction, basilar artery and left proximal posterior cerebral arteries are normal.   CTA NECK FINDINGS:   Right carotid vessels: The common carotid artery is normal. There is atherosclerotic calcification of the right carotid bifurcation, extending to the right proximal internal carotid artery resulting in narrowing of the lumen measuring up to 3.0 mm in compared to distal segment measuring up to 4.5 mm, compatible with approximately 30% stenosis.   Left carotid vessels: There is atherosclerotic calcification of the left carotid bifurcation, extending to the left proximal internal carotid artery, resulting in narrowing of the lumen measuring 3 mm in compared to distal segment measuring up to 5 mm, compatible with 40% stenosis.   Vertebral vessels: Dominant right vertebral artery. The visualized segments of the cervical vertebral arteries are normal in caliber.       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   2. Mild diffuse narrowing of the P1 and P2 segment of the right PCA. Otherwise no hemodynamically significant stenosis of the intracranial vasculature.   3. Approximately 30% right and 40% left narrowing of the internal carotid arteries in the neck.   Recommendation: If clinically concerned for small acute ischemic infarct, further evaluation with MR of the brain without contrast is recommended.   MACRO: None   Signed by: Jorge Alberto  Christen 6/26/2025 4:16 PM Dictation workstation:   [id number]    ECG 12 lead  Result Date: 6/26/2025  Normal sinus rhythm Right atrial enlargement Left axis deviation Right bundle branch block Abnormal ECG No previous ECGs available       Assessment/Plan   Assessment & Plan  Dizziness  74-year-old gentleman with no significant past medical history except gastroesophageal reflux for which he takes aluminum hydroxide.Is admitted with 2 or 3 days history of lightheadedness vomiting.  Patient also stated that he is very unsteady and off balance and has difficulty ambulation which is fairly acute only for the last 2 days and no history of trauma  1.  New onset lightheadedness and dizziness with vomiting etiology unclear could be benign positional vertigo, PT OT and vestibular exercise and neurology consultation.    2.  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    3.  Mild hypertension with blood pressure 152/80 mmHg, will observe      All the lab work are normal and EKG revealed complete right bundle branch block.  Etiology of his unsteady gait and difficulty ambulation is not clear.  He has been started on PT OT who suggested that he would need high intensity PT OT and acute rehab.  I have also requested neurology consultation for his unsteady gait.  I will check a serum B12 folate and TSH level, patient denies any history of alcohol abuse or alcohol indulgence and he does not smoke.    I personally examined him and reviewed his labs and imaging studies including CT of the brain and MRI.  Discussed the plan of treatment with patient in detail.  DVT prophylaxis, PT OT and observe closely   Neurological consultation recommendation.    Total time spent in examination counseling coordinating care for this patient was greater than 75 minutes.    Rodrick Mcmanus MD          [1] No family history on file.  [2]    [3] PRN medications: acetaminophen **OR** acetaminophen **OR** acetaminophen, meclizine, ondansetron **OR** ondansetron, polyethylene glycol

## 2025-06-28 NOTE — ASSESSMENT & PLAN NOTE
74-year-old gentleman with no significant past medical history except gastroesophageal reflux for which he takes aluminum hydroxide.Is admitted with 2 or 3 days history of lightheadedness vomiting.  Patient also stated that he is very unsteady and off balance and has difficulty ambulation which is fairly acute only for the last 2 days and no history of trauma

## 2025-06-29 ENCOUNTER — LAB REQUISITION (OUTPATIENT)
Dept: LAB | Facility: HOSPITAL | Age: 75
End: 2025-06-29
Payer: MEDICARE

## 2025-06-29 LAB
ANION GAP SERPL CALC-SCNC: 14 MMOL/L (ref 10–20)
BUN SERPL-MCNC: 23 MG/DL (ref 6–23)
CALCIUM SERPL-MCNC: 9 MG/DL (ref 8.6–10.3)
CHLORIDE SERPL-SCNC: 104 MMOL/L (ref 98–107)
CO2 SERPL-SCNC: 26 MMOL/L (ref 21–32)
CREAT SERPL-MCNC: 1.44 MG/DL (ref 0.5–1.3)
EGFRCR SERPLBLD CKD-EPI 2021: 51 ML/MIN/1.73M*2
ERYTHROCYTE [DISTWIDTH] IN BLOOD BY AUTOMATED COUNT: 13.1 % (ref 11.5–14.5)
GLUCOSE SERPL-MCNC: 96 MG/DL (ref 74–99)
HCT VFR BLD AUTO: 42.2 % (ref 41–52)
HGB BLD-MCNC: 13.6 G/DL (ref 13.5–17.5)
MCH RBC QN AUTO: 30 PG (ref 26–34)
MCHC RBC AUTO-ENTMCNC: 32.2 G/DL (ref 32–36)
MCV RBC AUTO: 93 FL (ref 80–100)
NRBC BLD-RTO: 0 /100 WBCS (ref 0–0)
PLATELET # BLD AUTO: 250 X10*3/UL (ref 150–450)
POTASSIUM SERPL-SCNC: 4 MMOL/L (ref 3.5–5.3)
RBC # BLD AUTO: 4.54 X10*6/UL (ref 4.5–5.9)
SODIUM SERPL-SCNC: 140 MMOL/L (ref 136–145)
WBC # BLD AUTO: 5.6 X10*3/UL (ref 4.4–11.3)

## 2025-06-29 PROCEDURE — 85027 COMPLETE CBC AUTOMATED: CPT

## 2025-06-29 PROCEDURE — 80048 BASIC METABOLIC PNL TOTAL CA: CPT

## 2025-06-29 NOTE — DISCHARGE SUMMARY
ADMISSION DATE: 6/26/2025     DISCHARGE DATE:  06/28/25     Discharge Diagnosis  Dizziness, vertigo and vomiting  Possible benign positional paroxysmal vertigo acute onset  Hyperlipidemia  Hypertension not on any medications  Possible TIA  Unsteady gait and gait ataxia    Issues Requiring Follow-Up  Discharged to acute rehab        Discharge Meds     Your medication list        START taking these medications        Instructions Last Dose Given Next Dose Due   aspirin 81 mg chewable tablet      Chew and swallow 1 tablet (81 mg) once daily.       atorvastatin 80 mg tablet  Commonly known as: Lipitor      Take 1 tablet (80 mg) by mouth once daily at bedtime.       clopidogrel 75 mg tablet  Commonly known as: Plavix      Take 1 tablet (75 mg) by mouth once daily for 21 days.       meclizine 25 mg tablet  Commonly known as: Antivert      Take 1 tablet (25 mg) by mouth 3 times a day as needed for dizziness.       pantoprazole 40 mg EC tablet  Commonly known as: ProtoNix  Start taking on: June 29, 2025      Take 1 tablet (40 mg) by mouth once daily in the morning. Take before meals. Do not crush, chew, or split.              STOP taking these medications      aluminum hydrox-magnesium carb 160-105 mg tablet,chewable                  Where to Get Your Medications        These medications were sent to [facility]/pharmacy #[id number] - [place], [place] - [address]  [id number], [facility] [place] [address]      Phone: [phone]   meclizine 25 mg tablet       Information about where to get these medications is not yet available    Ask your nurse or doctor about these medications  aspirin 81 mg chewable tablet  atorvastatin 80 mg tablet  clopidogrel 75 mg tablet  pantoprazole 40 mg EC tablet             Results for orders placed or performed during the hospital encounter of 06/26/25 (from the past 24 hours)   CBC   Result Value Ref Range    WBC 5.6 4.4 - 11.3 x10*3/uL    nRBC 0.0 0.0 - 0.0 /100 WBCs    RBC 4.27 (L) 4.50 - 5.90  x10*6/uL    Hemoglobin 13.1 (L) 13.5 - 17.5 g/dL    Hematocrit 38.5 (L) 41.0 - 52.0 %    MCV 90 80 - 100 fL    MCH 30.7 26.0 - 34.0 pg    MCHC 34.0 32.0 - 36.0 g/dL    RDW 13.2 11.5 - 14.5 %    Platelets 240 150 - 450 x10*3/uL   Basic metabolic panel   Result Value Ref Range    Glucose 92 74 - 99 mg/dL    Sodium 139 136 - 145 mmol/L    Potassium 3.5 3.5 - 5.3 mmol/L    Chloride 106 98 - 107 mmol/L    Bicarbonate 23 21 - 32 mmol/L    Anion Gap 14 10 - 20 mmol/L    Urea Nitrogen 21 6 - 23 mg/dL    Creatinine 1.35 (H) 0.50 - 1.30 mg/dL    eGFR 55 (L) >60 mL/min/1.73m*2    Calcium 8.9 8.6 - 10.3 mg/dL   Vitamin B12   Result Value Ref Range    Vitamin B12 290 211 - 911 pg/mL   Folate   Result Value Ref Range    Folate, Serum 8.3 >5.0 ng/mL   TSH with reflex to Free T4 if abnormal   Result Value Ref Range    Thyroid Stimulating Hormone 2.41 0.44 - 3.98 mIU/L   Lipid panel   Result Value Ref Range    Cholesterol 194 0 - 199 mg/dL    HDL-Cholesterol 44.6 mg/dL    Cholesterol/HDL Ratio 4.3     LDL Calculated 138 (H) <=99 mg/dL    VLDL 12 0 - 40 mg/dL    Triglycerides 58 0 - 149 mg/dL    Non HDL Cholesterol 149 0 - 149 mg/dL   Transthoracic Echo Complete   Result Value Ref Range    AV pk nixon 1.99 m/s    AV mn grad 9 mmHg    LVOT diam 1.95 cm    MV E/A ratio 0.69     LA vol index A/L 25.6 ml/m2    Tricuspid annular plane systolic excursion 2.0 cm    LV EF 68 %    RV free wall pk S' 15.92 cm/s    LVIDd 3.59 cm    RVSP 21 mmHg    Aortic Valve Area by Continuity of VTI 1.85 cm2    Aortic Valve Area by Continuity of Peak Velocity 1.99 cm2    AV pk grad 16 mmHg    LV A4C EF 64.4       Transthoracic Echo Complete  Result Date: 6/28/2025   [facility], [address] [place] [address]              Tel [phone] and Fax [phone] TRANSTHORACIC ECHOCARDIOGRAM REPORT  Patient Name:       NATALIA Pacheco Physician:    [id number] Roshan Fonseca  MD Study Date:         6/28/2025           Ordering Provider:    [id number] MONTSERRAT DANICA BUSTILLOSHTA MRN/PID:            [id number]            Fellow: Accession#:         [id number]        Nurse: Date of Birth/Age:  1950 / 74     Sonographer:          Simran Fernandez RDCS                     years Gender assigned at  M                   Additional Staff: Birth: Height:             175.26 cm           Admit Date:           6/26/2025 Weight:             77.11 kg            Admission Status:     Inpatient -                                                               Routine BSA / BMI:          1.93 m2 / 25.10     Encounter#:           [id number]                     kg/m2 Blood Pressure:     170/98 mmHg         Department Location:  [facility] Non                                                               Invasive Study Type:    TRANSTHORACIC ECHO (TTE) COMPLETE Diagnosis/ICD: 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 Technically challenging study due to body habitus and               prominent lung artifact. Agitated saline used as a contrast agent               for intraseptal flow evaluation.  PHYSICIAN INTERPRETATION: Left Ventricle: The left ventricular systolic function is normal with a visually estimated ejection fraction of 65-70%. There are no regional left ventricular wall motion abnormalities. The left ventricular cavity size is normal. There is mildly increased septal and normal posterior left ventricular wall thickness. There is left ventricular concentric remodeling. Spectral Doppler shows a Grade I (impaired relaxation pattern) of left ventricular diastolic filling with normal left atrial filling pressure. Left Atrium: The  left atrial size is normal. A bubble study using agitated saline was performed. Bubble study is negative. Right Ventricle: The right ventricle is normal in size. There is normal right ventricular global systolic function. Right Atrium: The right atrium is normal in size. Aortic Valve: The aortic valve is trileaflet. The aortic valve area by VTI is 1.85 cmï¿½ with a peak velocity of 1.99 m/s. The peak and mean gradients are 15 mmHg and 9 mmHg, respectively with a dimensionless index of 0.62. There is mild aortic valve cusp calcification. There is mild to moderate aortic valve thickening. There is reduced systolic aortic valve leaflet excursion. There is evidence of mild aortic valve stenosis. There is trace aortic valve regurgitation. Mitral Valve: The mitral valve is mildly thickened. The doppler estimated peak and mean diastolic pressure gradients are 15.9 mmHg and 5 mmHg, respectively. There is mild mitral annular calcification. The mean gradient of the mitral valve is 5 mmHg. There is trace mitral valve regurgitation. The E Vmax is 1.10 m/s. Tricuspid Valve: The tricuspid valve is structurally normal. There is trace tricuspid regurgitation. The Doppler estimated right ventricular systolic pressure (RVSP) is within normal limits at 21 mmHg. Pulmonic Valve: The pulmonic valve is structurally normal. There is mild pulmonic valve regurgitation. Pericardium: Trivial pericardial effusion. Aorta: The aortic root is normal. Systemic Veins: The inferior vena cava appears small in size, with IVC inspiratory collapse greater than 50%. In comparison to the previous echocardiogram(s): There are no prior studies on this patient for comparison purposes.  CONCLUSIONS:  1. The left ventricular systolic function is normal with a visually estimated ejection fraction of 65-70%.  2. Spectral Doppler shows a Grade I (impaired relaxation pattern) of left ventricular diastolic filling with normal left atrial filling pressure.  3. There  is normal right ventricular global systolic function.  4. Mild aortic valve stenosis. The peak and mean gradients are 16 mmHg and 9 mmHg respectively.  5. The Doppler estimated RVSP is within normal limits at 21 mmHg. QUANTITATIVE DATA SUMMARY:  2D MEASUREMENTS:          Normal Ranges: Ao Root s:       3.44 cm IVSd:            1.16 cm  (0.6-1.1cm) LVPWd:           0.82 cm  (0.6-1.1cm) LVIDd:           3.59 cm  (3.9-5.9cm) LVIDs:           2.17 cm LV Mass Index:   55 g/m2 LVEDV Index:     21 ml/m2 LV % FS          39.6 %  LEFT ATRIUM:                  Normal Ranges: LA Vol A4C:        46.8 ml    (22+/-6mL/m2) LA Vol A2C:        51.0 ml LA Vol BP:         49.3 ml LA Vol Index A4C:  24.3ml/m2 LA Vol Index A2C:  26.5 ml/m2 LA Vol Index BP:   25.6 ml/m2 LA Area A4C:       17.4 cm2 LA Area A2C:       18.0 cm2 LA Major Axis A4C: 5.5 cm LA Major Axis A2C: 5.4 cm LA Volume Index:   25.6 ml/m2 LA Vol A4C:        42.5 ml LA Vol A2C:        48.8 ml LA Vol Index BSA:  23.7 ml/m2  RIGHT ATRIUM:                 Normal Ranges: RA Vol A4C:        40.9 ml    (8.3-19.5ml) RA Vol Index A4C:  21.2 ml/m2 RA Area A4C:       15.2 cm2 RA Major Axis A4C: 4.8 cm  M-MODE MEASUREMENTS:         Normal Ranges: LAs:                 3.26 cm (2.7-4.0cm)  AORTA MEASUREMENTS:         Normal Ranges: Ao Sinus, d:        3.40 cm (2.1-3.5cm) Ao STJ, d:          2.30 cm (1.7-3.4cm) Asc Ao, d:          3.30 cm (2.1-3.4cm)  LV SYSTOLIC FUNCTION:                      Normal Ranges: EF-A4C View:    64 % (>=55%) EF-A2C View:    75 % EF-Biplane:     68 % EF-Visual:      68 % LV EF Reported: 68 %  LV DIASTOLIC FUNCTION:             Normal Ranges: MV Peak E:             1.10 m/s    (0.7-1.2 m/s) MV Peak A:             1.59 m/s    (0.42-0.7 m/s) E/A Ratio:             0.69        (1.0-2.2) MV e'                  0.070 m/s   (>8.0) MV lateral e'          0.08 m/s MV medial e'           0.06 m/s MV A Dur:              138.92 msec E/e' Ratio:            15.67        (<8.0) a'                     0.14 m/s PulmV Sys Martin:         53.91 cm/s PulmV Price Martin:        25.32 cm/s PulmV S/D Martin:         2.13 PulmV A Revs Martin:      27.54 cm/s PulmV A Revs Dur:      144.62 msec  MITRAL VALVE:           Normal Ranges: MV Vmax:      1.99 m/s  (<=1.3m/s) MV peak PG:   15.9 mmHg (<5mmHg) MV mean P.3 mmHg  (<2mmHg) MV DT:        383 msec  (150-240msec)  AORTIC VALVE:                      Normal Ranges: AoV Vmax:                1.99 m/s  (<=1.7m/s) AoV Peak PG:             15.9 mmHg (<20mmHg) AoV Mean P.8 mmHg  (1.7-11.5mmHg) LVOT Max Martin:            1.33 m/s  (<=1.1m/s) AoV VTI:                 35.17 cm  (18-25cm) LVOT VTI:                21.70 cm LVOT Diameter:           1.95 cm   (1.8-2.4cm) AoV Area, VTI:           1.85 cm2  (2.5-5.5cm2) AoV Area,Vmax:           1.99 cm2  (2.5-4.5cm2) AoV Dimensionless Index: 0.62  RIGHT VENTRICLE: RV Basal 3.40 cm RV Mid   2.40 cm RV Major 6.3 cm TAPSE:   20.2 mm RV s'    0.16 m/s  TRICUSPID VALVE/RVSP:          Normal Ranges: Peak TR Velocity:     2.09 m/s Est. RA Pressure:     3 RV Syst Pressure:     21       (< 30mmHg) IVC Diam:             0.80 cm  PULMONIC VALVE:          Normal Ranges: PV Accel Time:  59 msec  (>120ms) PV Max Martin:     1.2 m/s  (0.6-0.9m/s) PV Max P.4 mmHg  PULMONARY VEINS: PulmV A Revs Dur: 144.62 msec PulmV A Revs Martin: 27.54 cm/s PulmV Price Martin:   25.32 cm/s PulmV S/D Martin:    2.13 PulmV Sys Martin:    53.91 cm/s  AORTA: Asc Ao Diam 3.30 cm  [id number] Roshan Fonseca MD Electronically signed on 2025 at 1:19:37 PM  ** Final **     MR brain wo IV contrast  Result Date: 2025  Interpreted By:  Roshan Vasquez, STUDY: MR BRAIN WO IV CONTRAST;  2025 8:24 pm   INDICATION: Signs/Symptoms:dizziness with gait abnormality and nystagmus.     COMPARISON: None.   ACCESSION NUMBER(S): [id number]   ORDERING CLINICIAN: DARIAN JONES   TECHNIQUE: Multiplanar, multi-sequence images of the brain were obtained  without contrast.   FINDINGS:   Diffusion weighted images show no evidence of acute ischemic infarct.   The major intracranial flow voids are preserved.   Moderate periventricular and subcortical hemispheric T2/FLAIR white matter hyperintensities are most compatible with chronic small vessel ischemic disease.   There is no pathologic susceptibility artifact on GRE images.   There is no acute intraparenchymal hemorrhage, mass, mass-effect, or an extra-axial fluid collection.   The ventricular size and cerebral volume are age-concordant.   Normal morphology of midline structures. The craniovertebral junction is normal.   The orbits and globes are unremarkable. Left lens replacement noted.   The paranasal sinuses show no air-fluid levels or hemorrhage.   The mastoid air cells are clear.       No acute ischemic infarct, acute hemorrhage, or intracranial mass effect.   Moderate burden of supratentorial chronic small vessel ischemic disease.   MACRO: None.   Signed by: Roshan Vasquez 6/26/2025 9:02 PM Dictation workstation:   [id number]    CT angio head and neck w and wo IV contrast  Result Date: 6/26/2025  Interpreted By:  Jorge Alberto Velásquez, STUDY: CT ANGIO HEAD AND NECK W AND WO IV CONTRAST;  6/26/2025 3:45 pm   INDICATION: Signs/Symptoms:Dizziness with nausea, since 2am.     COMPARISON: None.   ACCESSION NUMBER(S): [id number]   ORDERING CLINICIAN: GAYLE MCCALLUM   TECHNIQUE: Unenhanced CT images of the head were obtained. Subsequently, 75 ML of Omnipaque 350 was administered intravenously and axial images of the head and neck were acquired.  Coronal, sagittal, and 3-D reconstructions were provided for review.   FINDINGS: CTA HEAD FINDINGS:   Anterior circulation: There is a 3.3 mm laterally projecting outpouching arising from the supraclinoid segment of the left internal carotid artery, likely small aneurysm. Otherwise bilateral intracranial internal carotid arteries, bilateral carotid terminals, bilateral  proximal anterior and middle cerebral arteries are patent without hemodynamically significant stenosis.   Posterior circulation: Mild diffuse narrowing of the P1 and P2 segment of the right PCA. Bilateral intracranial vertebral arteries, vertebrobasilar junction, basilar artery and left proximal posterior cerebral arteries are normal.   CTA NECK FINDINGS:   Right carotid vessels: The common carotid artery is normal. There is atherosclerotic calcification of the right carotid bifurcation, extending to the right proximal internal carotid artery resulting in narrowing of the lumen measuring up to 3.0 mm in compared to distal segment measuring up to 4.5 mm, compatible with approximately 30% stenosis.   Left carotid vessels: There is atherosclerotic calcification of the left carotid bifurcation, extending to the left proximal internal carotid artery, resulting in narrowing of the lumen measuring 3 mm in compared to distal segment measuring up to 5 mm, compatible with 40% stenosis.   Vertebral vessels: Dominant right vertebral artery. The visualized segments of the cervical vertebral arteries are normal in caliber.       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   2. Mild diffuse narrowing of the P1 and P2 segment of the right PCA. Otherwise no hemodynamically significant stenosis of the intracranial vasculature.   3. Approximately 30% right and 40% left narrowing of the internal carotid arteries in the neck.   Recommendation: If clinically concerned for small acute ischemic infarct, further evaluation with MR of the brain without contrast is recommended.   MACRO: None   Signed by: Jorge Alberto Velásquez 6/26/2025 4:16 PM Dictation workstation:   [id number]    ECG 12 lead  Result Date: 6/26/2025  Normal sinus rhythm Right atrial enlargement Left axis deviation Right bundle branch block Abnormal ECG No previous ECGs available          Hospital Course   74-year-old gentleman with no significant past medical history except gastroesophageal reflux for which he takes aluminum hydroxide.Is admitted with 2 or 3 days history of lightheadedness vomiting.  Patient also stated that he is very unsteady and off balance and has difficulty ambulation which is fairly acute only for the last 2 days and no history of trauma  1.  New onset lightheadedness and dizziness with vomiting etiology unclear could be benign positional vertigo, PT OT and vestibular exercise  Neurology consultation appreciated and recommendations noted.     2.  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  Possibility of a stroke/TIA exist as per neurologist and patient has been started on aspirin 81 mg daily and Plavix 75 mg daily, Plavix could be discontinued after 3 weeks.     3.  Mild hypertension with blood pressure 152/80 mmHg, will observe closely.     4.  Hyperlipidemia with LDL of 138, patient started on atorvastatin 80 mg daily.        All the lab work are normal and EKG revealed complete right bundle branch block.  Etiology of his unsteady gait and difficulty ambulation is not clear.  He has been started on PT OT who suggested that he would need high intensity PT OT and acute rehab.  Neurology consultation and recommendations carried out.  Serum B12, folate results are pending, and TSH level is normal.  Patient denies any history of alcohol abuse or alcohol indulgence and he does not smoke.  Patient had transthoracic echocardiogram today which revealed normal left ventricular systolic function and no significant wall motion abnormality and no valvular abnormalities, moderate diastolic dysfunction.  He is also advised to have a Holter monitor as an outpatient since being his surgery date could not be placed today.     I  "personally examined him and reviewed his labs and imaging studies including CT of the brain and MRI.  Discussed the plan of treatment with patient in detail.  DVT prophylaxis, PT OT .     Stable for discharge to acute rehab today.        I spent 35 minutes in the professional and overall care of this patient.       Pertinent Physical Exam At Time of Discharge  GENERAL:  Alert, no distress, cooperative  SKIN:  Skin color, texture, turgor normal. No rashes or lesions.  OROPHARYNX:  Lips, mucosa, and tongue are normal.Teeth and gums, normal. Oropharynx normal.  NECK:  No jugulovenous distention, No carotid bruits, Carotid pulse normal contour, Supple  LUNGS:  Lungs clear to auscultation. Good diaphragmatic excursion.  CARDIAC:  Normal S1 and S2; no rubs, murmurs, or gallops  ABDOMEN:  Abdomen soft, non-tender, BS normal, No masses or organomegaly  EXTREMETIES:  Extremities normal, no deformities, edema, clubbing or skin discoloration. Good capillary refill., No ulcers  NEURO:  Alert, oriented X 3, Gait not examined, nonfocal, patient does have a gait ataxia.  See detailed neurological exam.  PULSES:  2+ radial, 2+ carotid     Last Recorded Vitals  Blood pressure 141/75, pulse 76, temperature 36.7 °C (98.1 °F), temperature source Temporal, resp. rate 16, height 1.753 m (5' 9\"), weight 77.1 kg (170 lb), SpO2 98%.  Intake/Output last 3 Shifts:  I/O last 3 completed shifts:  In: 412.5 (5.3 mL/kg) [I.V.:412.5 (5.3 mL/kg)]  Out: - (0 mL/kg)   Weight: 77.1 kg    Outpatient Follow-Up  No future appointments.      Rodrick Mcmanus MD  "

## 2025-07-01 ENCOUNTER — LAB REQUISITION (OUTPATIENT)
Dept: LAB | Facility: HOSPITAL | Age: 75
End: 2025-07-01
Payer: MEDICARE

## 2025-07-01 LAB
ANION GAP SERPL CALC-SCNC: 10 MMOL/L (ref 10–20)
BUN SERPL-MCNC: 23 MG/DL (ref 6–23)
CALCIUM SERPL-MCNC: 8.5 MG/DL (ref 8.6–10.3)
CHLORIDE SERPL-SCNC: 107 MMOL/L (ref 98–107)
CO2 SERPL-SCNC: 27 MMOL/L (ref 21–32)
CREAT SERPL-MCNC: 1.34 MG/DL (ref 0.5–1.3)
EGFRCR SERPLBLD CKD-EPI 2021: 55 ML/MIN/1.73M*2
ERYTHROCYTE [DISTWIDTH] IN BLOOD BY AUTOMATED COUNT: 12.9 % (ref 11.5–14.5)
GLUCOSE SERPL-MCNC: 94 MG/DL (ref 74–99)
HCT VFR BLD AUTO: 40.1 % (ref 41–52)
HGB BLD-MCNC: 12.8 G/DL (ref 13.5–17.5)
MCH RBC QN AUTO: 29.9 PG (ref 26–34)
MCHC RBC AUTO-ENTMCNC: 31.9 G/DL (ref 32–36)
MCV RBC AUTO: 94 FL (ref 80–100)
NRBC BLD-RTO: 0 /100 WBCS (ref 0–0)
PLATELET # BLD AUTO: 217 X10*3/UL (ref 150–450)
POTASSIUM SERPL-SCNC: 3.4 MMOL/L (ref 3.5–5.3)
RBC # BLD AUTO: 4.28 X10*6/UL (ref 4.5–5.9)
SODIUM SERPL-SCNC: 141 MMOL/L (ref 136–145)
WBC # BLD AUTO: 4.6 X10*3/UL (ref 4.4–11.3)

## 2025-07-01 PROCEDURE — 80048 BASIC METABOLIC PNL TOTAL CA: CPT

## 2025-07-01 PROCEDURE — 85027 COMPLETE CBC AUTOMATED: CPT

## 2025-07-03 ENCOUNTER — DOCUMENTATION (OUTPATIENT)
Dept: HOME HEALTH SERVICES | Facility: HOME HEALTH | Age: 75
End: 2025-07-03
Payer: MEDICARE

## 2025-07-03 ENCOUNTER — HOME HEALTH ADMISSION (OUTPATIENT)
Dept: HOME HEALTH SERVICES | Facility: HOME HEALTH | Age: 75
End: 2025-07-03
Payer: MEDICARE

## 2025-07-03 NOTE — HH CARE COORDINATION
Home Care received a Referral for Nursing, Physical Therapy, Occupational Therapy, and Speech Language Pathology. We have processed the referral for a Start of Care on 7/4-7/5.     If you have any questions or concerns, please feel free to contact us at [phone]. Follow the prompts, enter your five digit zip code, and you will be directed to your care team on [facility] 2.

## 2025-07-04 ENCOUNTER — LAB REQUISITION (OUTPATIENT)
Dept: LAB | Facility: HOSPITAL | Age: 75
End: 2025-07-04
Payer: MEDICARE

## 2025-07-04 LAB
ANION GAP SERPL CALC-SCNC: 13 MMOL/L (ref 10–20)
BUN SERPL-MCNC: 18 MG/DL (ref 6–23)
CALCIUM SERPL-MCNC: 8.5 MG/DL (ref 8.6–10.3)
CHLORIDE SERPL-SCNC: 108 MMOL/L (ref 98–107)
CO2 SERPL-SCNC: 24 MMOL/L (ref 21–32)
CREAT SERPL-MCNC: 1.39 MG/DL (ref 0.5–1.3)
EGFRCR SERPLBLD CKD-EPI 2021: 53 ML/MIN/1.73M*2
GLUCOSE SERPL-MCNC: 87 MG/DL (ref 74–99)
POTASSIUM SERPL-SCNC: 3.8 MMOL/L (ref 3.5–5.3)
SODIUM SERPL-SCNC: 141 MMOL/L (ref 136–145)

## 2025-07-04 PROCEDURE — 80048 BASIC METABOLIC PNL TOTAL CA: CPT

## 2025-07-10 ENCOUNTER — APPOINTMENT (OUTPATIENT)
Dept: PRIMARY CARE | Facility: CLINIC | Age: 75
End: 2025-07-10
Payer: MEDICARE

## 2025-07-11 ENCOUNTER — APPOINTMENT (OUTPATIENT)
Dept: PRIMARY CARE | Facility: CLINIC | Age: 75
End: 2025-07-11
Payer: MEDICARE

## 2025-07-11 PROBLEM — H04.129 DRY EYE: Status: ACTIVE | Noted: 2025-07-11

## 2025-07-11 PROBLEM — H52.01 HYPERMETROPIA OF RIGHT EYE: Status: ACTIVE | Noted: 2025-07-11

## 2025-07-11 PROBLEM — E53.8 B12 DEFICIENCY: Status: ACTIVE | Noted: 2025-07-11

## 2025-07-11 PROBLEM — H25.11 AGE-RELATED NUCLEAR CATARACT, RIGHT: Status: ACTIVE | Noted: 2025-07-11

## 2025-07-11 PROBLEM — Z96.1 PSEUDOPHAKIA, LEFT EYE: Status: ACTIVE | Noted: 2025-07-11

## 2025-07-11 PROBLEM — N18.30 CHRONIC KIDNEY DISEASE (CKD), STAGE III (MODERATE) (MULTI): Status: ACTIVE | Noted: 2025-07-11

## 2025-07-11 PROBLEM — H26.8 PSEUDOEXFOLIATION OF LENS CAPSULE, RIGHT EYE: Status: ACTIVE | Noted: 2025-07-11

## 2025-07-11 PROBLEM — H52.223 REGULAR ASTIGMATISM OF BOTH EYES: Status: ACTIVE | Noted: 2025-07-11

## 2025-07-11 PROBLEM — H40.1193 SEVERE STAGE CHRONIC OPEN ANGLE GLAUCOMA: Status: ACTIVE | Noted: 2025-07-11

## 2025-07-11 PROBLEM — H52.12 MYOPIA OF LEFT EYE: Status: ACTIVE | Noted: 2025-07-11

## 2025-07-11 PROBLEM — H35.039 HYPERTENSIVE RETINOPATHY: Status: ACTIVE | Noted: 2025-07-11

## 2025-07-11 PROBLEM — I10 BENIGN ESSENTIAL HYPERTENSION: Status: ACTIVE | Noted: 2025-07-11

## 2025-07-11 PROBLEM — H54.40 BLINDNESS OF LEFT EYE: Status: ACTIVE | Noted: 2025-07-11

## 2025-07-11 PROBLEM — G62.9 NEUROPATHY: Status: ACTIVE | Noted: 2025-07-11

## 2025-07-11 PROBLEM — E78.5 HYPERLIPIDEMIA: Status: ACTIVE | Noted: 2025-07-11

## 2025-07-11 RX ORDER — HYDRALAZINE HYDROCHLORIDE 25 MG/1
1 TABLET, FILM COATED ORAL
COMMUNITY
Start: 2025-07-03

## 2025-07-11 RX ORDER — LANOLIN ALCOHOL/MO/W.PET/CERES
1 CREAM (GRAM) TOPICAL DAILY
COMMUNITY
Start: 2018-12-10

## 2025-07-11 RX ORDER — FOLIC ACID/MULTIVIT,IRON,MINER 0.4MG-18MG
1 TABLET ORAL DAILY
COMMUNITY
Start: 2020-06-08

## 2025-07-11 RX ORDER — ROSUVASTATIN CALCIUM 20 MG/1
1 TABLET, COATED ORAL DAILY
COMMUNITY
Start: 2018-11-28

## 2025-07-11 RX ORDER — POTASSIUM CHLORIDE 20 MEQ/1
1 TABLET, EXTENDED RELEASE ORAL
COMMUNITY
Start: 2025-07-03

## 2025-07-19 LAB
ATRIAL RATE: 91 BPM
P AXIS: 78 DEGREES
P OFFSET: 210 MS
P ONSET: 160 MS
PR INTERVAL: 136 MS
Q ONSET: 228 MS
QRS COUNT: 15 BEATS
QRS DURATION: 138 MS
QT INTERVAL: 390 MS
QTC CALCULATION(BAZETT): 479 MS
QTC FREDERICIA: 448 MS
R AXIS: -83 DEGREES
T AXIS: 53 DEGREES
T OFFSET: 423 MS
VENTRICULAR RATE: 91 BPM

## 2025-08-01 ENCOUNTER — APPOINTMENT (OUTPATIENT)
Dept: PRIMARY CARE | Facility: CLINIC | Age: 75
End: 2025-08-01
Payer: MEDICARE

## 2025-08-01 VITALS
TEMPERATURE: 97.7 F | DIASTOLIC BLOOD PRESSURE: 54 MMHG | HEART RATE: 80 BPM | WEIGHT: 172.6 LBS | OXYGEN SATURATION: 99 % | SYSTOLIC BLOOD PRESSURE: 130 MMHG | RESPIRATION RATE: 12 BRPM | BODY MASS INDEX: 25.49 KG/M2

## 2025-08-01 DIAGNOSIS — N18.31 CHRONIC KIDNEY DISEASE, STAGE 3A (MULTI): ICD-10-CM

## 2025-08-01 DIAGNOSIS — R42 VERTIGO: ICD-10-CM

## 2025-08-01 DIAGNOSIS — I63.422 STROKE DUE TO EMBOLISM OF LEFT ANTERIOR CEREBRAL ARTERY (MULTI): ICD-10-CM

## 2025-08-01 DIAGNOSIS — N18.31 STAGE 3A CHRONIC KIDNEY DISEASE (MULTI): ICD-10-CM

## 2025-08-01 DIAGNOSIS — I50.30 HEART FAILURE WITH PRESERVED EJECTION FRACTION, UNSPECIFIED HF CHRONICITY: ICD-10-CM

## 2025-08-01 DIAGNOSIS — Z13.6 SCREENING FOR HEART DISEASE: ICD-10-CM

## 2025-08-01 DIAGNOSIS — H26.9 CATARACT OF LEFT EYE, UNSPECIFIED CATARACT TYPE: ICD-10-CM

## 2025-08-01 DIAGNOSIS — E53.8 B12 DEFICIENCY: ICD-10-CM

## 2025-08-01 DIAGNOSIS — R80.9 PROTEINURIA, UNSPECIFIED TYPE: ICD-10-CM

## 2025-08-01 DIAGNOSIS — Z13.6 SCREENING FOR AAA (ABDOMINAL AORTIC ANEURYSM): ICD-10-CM

## 2025-08-01 DIAGNOSIS — Z12.5 SCREENING FOR PROSTATE CANCER: ICD-10-CM

## 2025-08-01 DIAGNOSIS — Z79.899 DRUG THERAPY: ICD-10-CM

## 2025-08-01 DIAGNOSIS — Z12.11 SCREEN FOR COLON CANCER: ICD-10-CM

## 2025-08-01 DIAGNOSIS — E83.51 HYPOCALCEMIA: ICD-10-CM

## 2025-08-01 DIAGNOSIS — I10 BENIGN ESSENTIAL HYPERTENSION: ICD-10-CM

## 2025-08-01 DIAGNOSIS — I72.0 CAROTID ARTERY ANEURYSM: Primary | ICD-10-CM

## 2025-08-01 DIAGNOSIS — I65.23 CAROTID STENOSIS, BILATERAL: ICD-10-CM

## 2025-08-01 DIAGNOSIS — R42 DIZZINESS: ICD-10-CM

## 2025-08-01 PROCEDURE — 3078F DIAST BP <80 MM HG: CPT | Performed by: FAMILY MEDICINE

## 2025-08-01 PROCEDURE — G2211 COMPLEX E/M VISIT ADD ON: HCPCS | Performed by: FAMILY MEDICINE

## 2025-08-01 PROCEDURE — 1159F MED LIST DOCD IN RCRD: CPT | Performed by: FAMILY MEDICINE

## 2025-08-01 PROCEDURE — 99204 OFFICE O/P NEW MOD 45 MIN: CPT | Performed by: FAMILY MEDICINE

## 2025-08-01 PROCEDURE — 1160F RVW MEDS BY RX/DR IN RCRD: CPT | Performed by: FAMILY MEDICINE

## 2025-08-01 PROCEDURE — 3075F SYST BP GE 130 - 139MM HG: CPT | Performed by: FAMILY MEDICINE

## 2025-08-01 RX ORDER — CLOPIDOGREL BISULFATE 75 MG/1
TABLET ORAL DAILY
COMMUNITY
End: 2025-08-01 | Stop reason: SDUPTHER

## 2025-08-01 RX ORDER — CLOPIDOGREL BISULFATE 75 MG/1
75 TABLET ORAL DAILY
Qty: 90 TABLET | Refills: 0 | Status: SHIPPED | OUTPATIENT
Start: 2025-08-01

## 2025-08-01 RX ORDER — LOSARTAN POTASSIUM 50 MG/1
50 TABLET ORAL DAILY
Qty: 90 TABLET | Refills: 0 | Status: SHIPPED | OUTPATIENT
Start: 2025-08-01 | End: 2025-10-30

## 2025-08-01 RX ORDER — MECLIZINE HYDROCHLORIDE 25 MG/1
25 TABLET ORAL 3 TIMES DAILY PRN
Qty: 30 TABLET | Refills: 0 | Status: SHIPPED | OUTPATIENT
Start: 2025-08-01

## 2025-08-01 RX ORDER — ASPIRIN 81 MG/1
81 TABLET ORAL DAILY
Qty: 90 TABLET | Refills: 3 | Status: SHIPPED | OUTPATIENT
Start: 2025-08-01 | End: 2026-08-01

## 2025-08-01 RX ORDER — AMLODIPINE BESYLATE 10 MG/1
10 TABLET ORAL DAILY
COMMUNITY

## 2025-08-01 ASSESSMENT — PATIENT HEALTH QUESTIONNAIRE - PHQ9
2. FEELING DOWN, DEPRESSED OR HOPELESS: NOT AT ALL
1. LITTLE INTEREST OR PLEASURE IN DOING THINGS: NOT AT ALL
SUM OF ALL RESPONSES TO PHQ9 QUESTIONS 1 AND 2: 0

## 2025-08-01 NOTE — ASSESSMENT & PLAN NOTE
- Improved  - Referral for holter monitor  - continue the clopidogrel X 90days  - Meclizine- only as needed  Orders:    Holter or Event Cardiac Monitor; Future    meclizine (Antivert) 25 mg tablet; Take 1 tablet (25 mg) by mouth 3 times a day as needed for dizziness.

## 2025-08-01 NOTE — ASSESSMENT & PLAN NOTE
- Stable  - Continue to monitor      Orders:    Albumin-Creatinine Ratio, Urine Random; Future    Urinalysis with Reflex Microscopic; Future    Comprehensive metabolic panel; Future

## 2025-08-01 NOTE — PROGRESS NOTES
Subjective   Patient ID: Raymundo Herndon is a 74 y.o. male who presents for Follow-up (Hospital Discharge-Elevated Blood Pressure).    HPI       Pt with hx of CKD 3,HTN here for re-est care-last seen over 5 years ago:        Was admitted into the hospital from 6/26/2025 through 6/28/2025 for dizziness/vertigo/possible TIA  Was discharged to acute rehab and started on aspirin, atorvastatin, clopidogrel (can discontinue after 3 weeks), meclizine  Was with echo showed HFpEF, EF 65-70, mild aortic valve stenosis, MRI brain showed no acute ischemic infarct or changes, chronic small vessel ischemic disease.  CTA of the neck showed atherosclerosis and calcification in the right with 30% stenosis and on the left with 40% stenosis with aneurysm of the left internal carotid artery  Neurology recommended outpatient Holter    HTN  Denies CP, SOB, palpitations, change in vision, dizziness, N/V   Using amlodipine and hydralazine as without an issue     CKD3  Followed by renal team  CKD stable  Last BP check at at goal     c/o watery eyes  was told with dry eyes  does not use any drops           Health Maintenance:  -   Colonoscopy: Refusing colonoscopy-order Cologuard  -   Prostate screening: PSA-no PSA screening noted in chart-today  -   AAA screening- smoker history-due forAAA screening-ordered  -   Hep C screening-2018 completed  Immunizations due: RSV, COVID, shingles, pneumonia- declined      ---Social---  Job: real estate  from [employer]  :   Kids: 2         Review of Systems  All systems reviewed and neg if not noted in the HPI above       Objective   /54   Pulse 80   Temp 36.5 °C (97.7 °F)   Resp 12   Wt 78.3 kg (172 lb 9.6 oz)   SpO2 99%   BMI 25.49 kg/m²     Physical Exam  Constitutional:       Appearance: Normal appearance.   HENT:      Head: Normocephalic.      Right Ear: External ear normal.      Left Ear: External ear normal.      Nose: Nose normal.      Mouth/Throat:      Pharynx:  Oropharynx is clear.     Eyes:      Extraocular Movements: Extraocular movements intact.     Neck:      Thyroid: No thyroid mass, thyromegaly or thyroid tenderness.     Cardiovascular:      Rate and Rhythm: Normal rate and regular rhythm.      Heart sounds: Normal heart sounds. No murmur heard.  Pulmonary:      Effort: Pulmonary effort is normal. No respiratory distress.      Breath sounds: Normal breath sounds. No wheezing.   Abdominal:      General: Bowel sounds are normal.      Palpations: Abdomen is soft. There is no mass.      Tenderness: There is no abdominal tenderness.     Musculoskeletal:         General: Normal range of motion.      Cervical back: Normal range of motion.      Right lower leg: No edema.      Left lower leg: No edema.     Skin:     General: Skin is warm and dry.      Findings: No rash.     Neurological:      General: No focal deficit present.      Mental Status: He is alert and oriented to person, place, and time.      Motor: No weakness.     Psychiatric:         Mood and Affect: Mood normal.         Behavior: Behavior normal.         Assessment/Plan   Assessment & Plan  Carotid artery aneurysm  Carotids - imagining showed some narrowing and aneurysm  - referral for vascular doctor  Orders:    Referral to Vascular Medicine; Future    aspirin 81 mg EC tablet; Take 1 tablet (81 mg) by mouth once daily.    Stage 3a chronic kidney disease (Multi)  - Stable  - Continue to monitor      Orders:    Albumin-Creatinine Ratio, Urine Random; Future    Urinalysis with Reflex Microscopic; Future    Comprehensive metabolic panel; Future    losartan (Cozaar) 50 mg tablet; Take 1 tablet (50 mg) by mouth once daily.    Renal function panel; Future    Chronic kidney disease, stage 3a (Multi)  - Stable  - Continue to monitor      Orders:    Albumin-Creatinine Ratio, Urine Random; Future    Urinalysis with Reflex Microscopic; Future    Comprehensive metabolic panel; Future    Carotid stenosis,  bilateral  Carotids - imagining showed some narrowing and aneurysm  - referral for vascular doctor  Orders:    Referral to Vascular Medicine; Future    aspirin 81 mg EC tablet; Take 1 tablet (81 mg) by mouth once daily.    Hypocalcemia    Orders:    Vitamin D 25-Hydroxy,Total (for eval of Vitamin D levels); Future    Proteinuria, unspecified type    Orders:    Albumin-Creatinine Ratio, Urine Random; Future    Urinalysis with Reflex Microscopic; Future    losartan (Cozaar) 50 mg tablet; Take 1 tablet (50 mg) by mouth once daily.    Dizziness  - Improved  - Referral for holter monitor  - continue the clopidogrel X 90days  - Meclizine- only as needed  Orders:    Holter or Event Cardiac Monitor; Future    meclizine (Antivert) 25 mg tablet; Take 1 tablet (25 mg) by mouth 3 times a day as needed for dizziness.    Heart failure with preserved ejection fraction, unspecified HF chronicity    Orders:    Comprehensive metabolic panel; Future    Screening for AAA (abdominal aortic aneurysm)    Orders:    Vascular US abdominal aorta anuerysm AAA screening; Future    Stroke due to embolism of left anterior cerebral artery (Multi)    Orders:    clopidogrel (Plavix) 75 mg tablet; Take 1 tablet (75 mg) by mouth once daily.    aspirin 81 mg EC tablet; Take 1 tablet (81 mg) by mouth once daily.    Vertigo    Orders:    meclizine (Antivert) 25 mg tablet; Take 1 tablet (25 mg) by mouth 3 times a day as needed for dizziness.    B12 deficiency    Orders:    Vitamin B12; Future    Screening for prostate cancer    Orders:    PSA; Future    Benign essential hypertension  - continue the amlodipine  - stop the hydralazine  - stop the potassium  - start on losartan  - check labs in 2 wks with BP nurse clinic   Work on a healthy whole food diet and add at least 30min of exercise 5 days per week  - Work on a low salt diet  Orders:    Comprehensive metabolic panel; Future    losartan (Cozaar) 50 mg tablet; Take 1 tablet (50 mg) by mouth once  daily.    Drug therapy    Orders:    Vitamin D 25-Hydroxy,Total (for eval of Vitamin D levels); Future    Screening for heart disease         Cataract of left eye, unspecified cataract type    Orders:    Referral to Ophthalmology; Future    Screen for colon cancer    Orders:    Cologuard® colon cancer screening; Future        Please follow up in   - 2 wks for BP check nurse clinic and labs ( ordered)  - Medicare wellness by the end of the year (3-4months)  - or as needed.

## 2025-08-01 NOTE — ASSESSMENT & PLAN NOTE
- Stable  - Continue to monitor      Orders:    Albumin-Creatinine Ratio, Urine Random; Future    Urinalysis with Reflex Microscopic; Future    Comprehensive metabolic panel; Future    losartan (Cozaar) 50 mg tablet; Take 1 tablet (50 mg) by mouth once daily.    Renal function panel; Future

## 2025-08-01 NOTE — ASSESSMENT & PLAN NOTE
- continue the amlodipine  - stop the hydralazine  - stop the potassium  - start on losartan  - check labs in 2 wks with BP nurse clinic   Work on a healthy whole food diet and add at least 30min of exercise 5 days per week  - Work on a low salt diet  Orders:    Comprehensive metabolic panel; Future    losartan (Cozaar) 50 mg tablet; Take 1 tablet (50 mg) by mouth once daily.

## 2025-08-01 NOTE — PATIENT INSTRUCTIONS
Health Maintenance:  -   Colonoscopy: Refusing colonoscopy-order Cologuard  -   Prostate screening: PSA-no PSA screening noted in chart-today  -   AAA screening- smoker history-due forAAA screening-ordered  -   Hep C screening-2018 completed  Immunizations due: RSV, COVID, shingles, pneumonia- declined      HTN  - continue the amlodipine  - stop the hydralazine  - stop the potassium  - start on losartan  - check labs in 2 wks with BP nurse clinic   Work on a healthy whole food diet and add at least 30min of exercise 5 days per week  - Work on a low salt diet    Labs today    Carotids - imagining showed some narrowing and aneurysm  - referral for vascular doctor    Dizziness  - Improved  - Referral for holter monitor  - continue the clopidogrel X 90days  - Meclizine- only as needed    Due for aorta ultrasound- ordered    Referral for eye team for watery eyes    Sent the cologuard to your house      Please follow up in   - 2 wks for BP check nurse clinic and labs ( ordered)  - Medicare wellness by the end of the year (3-4months)  - or as needed.       ** If labs or imaging ordered at today's visit, all the non-urgent results will be discussed at your next visit    If you have been referred for a special test or to a specialist please call  [phone] to schedule an appointment.  If you have any further questions, or if develop new or worsened symptoms, please give our office a call at [phone].

## 2025-08-02 DIAGNOSIS — I63.422 STROKE DUE TO EMBOLISM OF LEFT ANTERIOR CEREBRAL ARTERY (MULTI): ICD-10-CM

## 2025-08-02 LAB
25(OH)D3+25(OH)D2 SERPL-MCNC: 23 NG/ML (ref 30–100)
ALBUMIN SERPL-MCNC: 4.6 G/DL (ref 3.6–5.1)
ALBUMIN/CREAT UR: NORMAL
ALP SERPL-CCNC: 105 U/L (ref 35–144)
ALT SERPL-CCNC: 27 U/L (ref 9–46)
ANION GAP SERPL CALCULATED.4IONS-SCNC: 10 MMOL/L (CALC) (ref 7–17)
APPEARANCE UR: CLEAR
AST SERPL-CCNC: 30 U/L (ref 10–35)
BILIRUB SERPL-MCNC: 0.5 MG/DL (ref 0.2–1.2)
BILIRUB UR QL STRIP: NEGATIVE
BUN SERPL-MCNC: 16 MG/DL (ref 7–25)
CALCIUM SERPL-MCNC: 9.6 MG/DL (ref 8.6–10.3)
CHLORIDE SERPL-SCNC: 108 MMOL/L (ref 98–110)
CO2 SERPL-SCNC: 26 MMOL/L (ref 20–32)
COLOR UR: YELLOW
CREAT SERPL-MCNC: 1.48 MG/DL (ref 0.7–1.28)
CREAT UR-MCNC: NORMAL MG/DL
EGFRCR SERPLBLD CKD-EPI 2021: 49 ML/MIN/1.73M2
GLUCOSE SERPL-MCNC: 92 MG/DL (ref 65–139)
GLUCOSE UR QL STRIP: NEGATIVE
HGB UR QL STRIP: NEGATIVE
KETONES UR QL STRIP: NEGATIVE
LEUKOCYTE ESTERASE UR QL STRIP: NEGATIVE
MICROALBUMIN UR-MCNC: NORMAL
NITRITE UR QL STRIP: NEGATIVE
PH UR STRIP: 5.5 [PH] (ref 5–8)
POTASSIUM SERPL-SCNC: 4.3 MMOL/L (ref 3.5–5.3)
PROT SERPL-MCNC: 7.4 G/DL (ref 6.1–8.1)
PROT UR QL STRIP: NEGATIVE
PSA SERPL-MCNC: 8.86 NG/ML
SODIUM SERPL-SCNC: 144 MMOL/L (ref 135–146)
SP GR UR STRIP: 1.02 (ref 1–1.03)
VIT B12 SERPL-MCNC: 339 PG/ML (ref 200–1100)

## 2025-08-04 LAB
25(OH)D3+25(OH)D2 SERPL-MCNC: 23 NG/ML (ref 30–100)
ALBUMIN SERPL-MCNC: 4.6 G/DL (ref 3.6–5.1)
ALBUMIN/CREAT UR: 17 MG/G CREAT
ALP SERPL-CCNC: 105 U/L (ref 35–144)
ALT SERPL-CCNC: 27 U/L (ref 9–46)
ANION GAP SERPL CALCULATED.4IONS-SCNC: 10 MMOL/L (CALC) (ref 7–17)
APPEARANCE UR: CLEAR
AST SERPL-CCNC: 30 U/L (ref 10–35)
BILIRUB SERPL-MCNC: 0.5 MG/DL (ref 0.2–1.2)
BILIRUB UR QL STRIP: NEGATIVE
BUN SERPL-MCNC: 16 MG/DL (ref 7–25)
CALCIUM SERPL-MCNC: 9.6 MG/DL (ref 8.6–10.3)
CHLORIDE SERPL-SCNC: 108 MMOL/L (ref 98–110)
CO2 SERPL-SCNC: 26 MMOL/L (ref 20–32)
COLOR UR: YELLOW
CREAT SERPL-MCNC: 1.48 MG/DL (ref 0.7–1.28)
CREAT UR-MCNC: 188 MG/DL (ref 20–320)
EGFRCR SERPLBLD CKD-EPI 2021: 49 ML/MIN/1.73M2
GLUCOSE SERPL-MCNC: 92 MG/DL (ref 65–139)
GLUCOSE UR QL STRIP: NEGATIVE
HGB UR QL STRIP: NEGATIVE
KETONES UR QL STRIP: NEGATIVE
LEUKOCYTE ESTERASE UR QL STRIP: NEGATIVE
MICROALBUMIN UR-MCNC: 3.2 MG/DL
NITRITE UR QL STRIP: NEGATIVE
PH UR STRIP: 5.5 [PH] (ref 5–8)
POTASSIUM SERPL-SCNC: 4.3 MMOL/L (ref 3.5–5.3)
PROT SERPL-MCNC: 7.4 G/DL (ref 6.1–8.1)
PROT UR QL STRIP: NEGATIVE
PSA SERPL-MCNC: 8.86 NG/ML
SODIUM SERPL-SCNC: 144 MMOL/L (ref 135–146)
SP GR UR STRIP: 1.02 (ref 1–1.03)
VIT B12 SERPL-MCNC: 339 PG/ML (ref 200–1100)

## 2025-08-04 RX ORDER — ATORVASTATIN CALCIUM 80 MG/1
80 TABLET, FILM COATED ORAL NIGHTLY
Qty: 30 TABLET | OUTPATIENT
Start: 2025-08-04

## 2025-08-15 DIAGNOSIS — N18.31 STAGE 3A CHRONIC KIDNEY DISEASE (MULTI): ICD-10-CM

## 2025-08-24 LAB — NONINV COLON CA DNA+OCC BLD SCRN STL QL: NEGATIVE

## 2025-12-16 ENCOUNTER — APPOINTMENT (OUTPATIENT)
Dept: PRIMARY CARE | Facility: CLINIC | Age: 75
End: 2025-12-16
Payer: MEDICARE